# Patient Record
Sex: FEMALE | Race: WHITE | NOT HISPANIC OR LATINO | URBAN - METROPOLITAN AREA
[De-identification: names, ages, dates, MRNs, and addresses within clinical notes are randomized per-mention and may not be internally consistent; named-entity substitution may affect disease eponyms.]

---

## 2017-07-15 ENCOUNTER — EMERGENCY (EMERGENCY)
Facility: HOSPITAL | Age: 22
LOS: 1 days | Discharge: HOME | End: 2017-07-15
Attending: EMERGENCY MEDICINE | Admitting: EMERGENCY MEDICINE
Payer: COMMERCIAL

## 2017-07-15 VITALS
DIASTOLIC BLOOD PRESSURE: 91 MMHG | RESPIRATION RATE: 18 BRPM | OXYGEN SATURATION: 96 % | SYSTOLIC BLOOD PRESSURE: 124 MMHG | HEART RATE: 61 BPM | TEMPERATURE: 99 F

## 2017-07-15 PROCEDURE — 99282 EMERGENCY DEPT VISIT SF MDM: CPT | Mod: 25

## 2017-07-15 PROCEDURE — 99282 EMERGENCY DEPT VISIT SF MDM: CPT

## 2017-07-15 NOTE — ED PROVIDER NOTE - MEDICAL DECISION MAKING DETAILS
NIMO Brunner MD: 22 year old female with PMH IV heroin abuse (off heroin x 2 months) is BIB aunt for an evaluation of her B/L UE scars. Per patient, the scars look better than they did 2 months ago, have healed and are now improving. She admits to a small amt. of tenderness in some areas but denies any major pain or ttp to either UE. Denies F/C, focal numbness/weakness, focal neurologic deficits, CP, SOB, HA, dizziness, visual changes, neck pain/stiffness, abd pain, N/V/D. Exam pertinent for a significant amount of raised, keratotic scar tissue to B/L UE. No areas of fluctuance. +Pink granulation tissue surrouding several areas of scarring without ttp/warmth. Neurovascularly intact distally to B/L UEs with intact radial and ulnar pulses and cap refill <3 seconds. Pt well-appearing, non-toxic appearing. Cor RRR no murmurs. Lungs CTAB.  DDx: Scar tissue / granulation tissue from prior IVDA sites, possible underlying small abscess, possible retained FB beneath scar tissue (e.g. retained needle part)  I discussed with patient that we can obtain a set of Bcx to r/o endocarditis (although no signs/sx of it on exam), obtain b/l UE US to r/o underlying abscess and B/L UE xrays to r/o retained FB. Pt initially amenable to work-up (which was moreso requested by her aunt), but later did not wish to have any add'l work-up and requested to leave AMA. Myself and resident had multiple conversations with patient urging her to stay for the work-up, however, despite alternatives, knowledge of risks of leaving AMA, she wished to leave. Patient eloped prior to signing or receiving any paperwork and before the full AMA discussion. NIMO Brunner MD: 22 year old female with PMH IV heroin abuse (off heroin x 2 months) is BIB aunt for an evaluation of her B/L UE scars. Per patient, the scars look better than they did 2 months ago, have healed and are now improving. She admits to a small amt. of tenderness in some areas but denies any major pain or ttp to either UE. Denies F/C, focal numbness/weakness, focal neurologic deficits, CP, SOB, HA, dizziness, visual changes, neck pain/stiffness, abd pain, N/V/D. Exam pertinent for a significant amount of raised, keratotic scar tissue to B/L UE. No areas of fluctuance. +Pink granulation tissue surrouding several areas of scarring without ttp/warmth. Neurovascularly intact distally to B/L UEs with intact radial and ulnar pulses and cap refill <3 seconds. Pt well-appearing, non-toxic appearing. Cor RRR no murmurs. Lungs CTAB.  DDx: Scar tissue / granulation tissue from prior IVDA sites, possible underlying small abscess, possible retained FB beneath scar tissue (e.g. retained needle part)  I discussed with patient that we can obtain a set of Bcx to r/o endocarditis (although no signs/sx of it on exam), obtain b/l UE US to r/o underlying abscess and B/L UE xrays to r/o retained FB. Pt initially amenable to work-up (which was moreso requested by her aunt), but later did not wish to have any add'l work-up and requested to leave AMA. Myself and resident had multiple conversations with patient urging her to stay for the work-up, however, despite alternatives, knowledge of risks of leaving AMA, she wished to leave. Patient eloped prior to signing or receiving any paperwork and before the full AMA discussion. During her stay, I strongly advised her to establish a primary care physician for f/u of her chronic issues.

## 2017-07-15 NOTE — ED PROVIDER NOTE - OBJECTIVE STATEMENT
22 year old, with arm pain and swelling s/p herion injection 2 months ago. patient states shes had a lesion a few days after and it swelled over time and is looking moderately better. family member saw the wound today and requested patient seek attention. denies fevers. no fevers, no coughs, no night sweats. is currently on suboxone and states recent HIV test and does not want one today.    PMD: none

## 2017-07-15 NOTE — ED PROVIDER NOTE - PROGRESS NOTE DETAILS
Calixto PGY3: patient extremely anxious about having blood work obtained. doesn't want to be stuck by needles. nurse obtained cultures and lost the vein. refused more sticks. patient refusing continued work up and admission although admission recommended. patient eloped before opportunity to discharge ama.

## 2017-07-15 NOTE — ED PROVIDER NOTE - ATTENDING CONTRIBUTION TO CARE
I saw and evaluated patient with resident. I discussed H+P and MDM with resident. I agree with the statements made by the resident unless otherwise noted.

## 2017-07-15 NOTE — ED PROVIDER NOTE - CHPI ED SYMPTOMS NEG
no chills/no fever/no nausea/no weakness/no numbness/no decreased eating/drinking/no vomiting/no tingling/no pain

## 2017-07-15 NOTE — ED ADULT NURSE NOTE - OBJECTIVE STATEMENT
Pt with hx IV drug abuse presents with left arm wounds/pain x1 month. Multiple areas of eschar noted to left upper arm. Pt confided in aunt and was encouraged to seek medical attention. Pt requesting not to have blood drawn or be admitted regardless of results. Pt is A&Ox3. Cachectic. Ambulates. Denies cp/sob. Respirations even/unlabored. Abd flat. Denies n/v/d/urinary symptoms. Multiple areas of scarring noted to bilateral arms. Pt not forthcoming with information. Pt with hx IV drug abuse presents with left arm wounds/pain x1 month. Multiple areas of eschar noted to left upper arm. Pt confided in aunt and was encouraged to seek medical attention. Pt requesting not to have blood drawn or be admitted regardless of results. Pt is A&Ox3. Cachectic. Ambulates. Denies cp/sob. Respirations even/unlabored. Abd flat. Denies n/v/d/urinary symptoms. Multiple areas of scarring noted to bilateral arms. Pt not forthcoming with information.    Attempt to obtain labs was unsuccessful. MD aware.

## 2017-07-15 NOTE — ED ADULT TRIAGE NOTE - ARRIVAL INFO ADDITIONAL COMMENTS
noted b/l arm severe track marks for heroine and cocaine abuse last taken one month ago/states im in rehab

## 2017-07-15 NOTE — ED PROVIDER NOTE - PHYSICAL EXAMINATION
Calixto: A & O x 3, NAD, HEENT WNL and no facial asymmetry; lungs CTAB, heart with reg rhythm,  abdomen soft NTND; extremities with no edema; skin with extensive trackmarks on bilateral distal arms, left proximal upper extremity with numerous coin size eschars, mild erythema, dried oozing wounds, proximal warmth at level of shoulder, neuro exam non focal with no motor or sensory deficits Calixto: A & O x 3, NAD, HEENT WNL and no facial asymmetry; lungs CTAB, heart with reg rhythm,  abdomen soft NTND; extremities with no edema; skin with extensive trackmarks on bilateral distal arms, left proximal upper extremity with numerous coin size eschars, mild erythema, dried wounds, proximal warmth at level of shoulder, neuro exam non focal with no motor or sensory deficits

## 2017-07-15 NOTE — ED PROVIDER NOTE - SKIN WOUND DESCRIPTION
skin with extensive trackmarks on bilateral distal arms, left proximal upper extremity with numerous coin size eschars, mild erythema, dried wounds, proximal warmth at level of shoulder

## 2017-07-15 NOTE — ED PROVIDER NOTE - NS ED ROS FT
CONST: no fevers, no chills  EYES: no pain  ENT: no sore throat   CV: no chest pain  RESP: no shortness of breath  ABD: no abdominal pain   : no dysuria  MSK: no back pain  NEURO: no headache or additional neurologic complaints  HEME: no easy bleeding  SKIN:  left arm skin wound in AC

## 2017-07-16 VITALS
HEART RATE: 87 BPM | SYSTOLIC BLOOD PRESSURE: 135 MMHG | RESPIRATION RATE: 18 BRPM | OXYGEN SATURATION: 100 % | DIASTOLIC BLOOD PRESSURE: 91 MMHG

## 2018-05-19 ENCOUNTER — EMERGENCY (EMERGENCY)
Facility: HOSPITAL | Age: 23
LOS: 1 days | Discharge: HOME | End: 2018-05-19
Attending: EMERGENCY MEDICINE | Admitting: EMERGENCY MEDICINE
Payer: COMMERCIAL

## 2018-05-19 VITALS
DIASTOLIC BLOOD PRESSURE: 73 MMHG | TEMPERATURE: 98 F | HEART RATE: 56 BPM | OXYGEN SATURATION: 98 % | RESPIRATION RATE: 16 BRPM | SYSTOLIC BLOOD PRESSURE: 104 MMHG

## 2018-05-19 VITALS
RESPIRATION RATE: 18 BRPM | OXYGEN SATURATION: 100 % | HEART RATE: 79 BPM | DIASTOLIC BLOOD PRESSURE: 85 MMHG | TEMPERATURE: 99 F | SYSTOLIC BLOOD PRESSURE: 129 MMHG

## 2018-05-19 LAB
ALBUMIN SERPL ELPH-MCNC: 3.4 G/DL — SIGNIFICANT CHANGE UP (ref 3.3–5)
ALP SERPL-CCNC: 124 U/L — HIGH (ref 40–120)
ALT FLD-CCNC: 49 U/L — HIGH (ref 4–33)
APAP SERPL-MCNC: < 15 UG/ML — LOW (ref 15–25)
AST SERPL-CCNC: 46 U/L — HIGH (ref 4–32)
BASOPHILS # BLD AUTO: 0.06 K/UL — SIGNIFICANT CHANGE UP (ref 0–0.2)
BASOPHILS NFR BLD AUTO: 0.9 % — SIGNIFICANT CHANGE UP (ref 0–2)
BILIRUB SERPL-MCNC: 0.5 MG/DL — SIGNIFICANT CHANGE UP (ref 0.2–1.2)
BUN SERPL-MCNC: 5 MG/DL — LOW (ref 7–23)
CALCIUM SERPL-MCNC: 8.5 MG/DL — SIGNIFICANT CHANGE UP (ref 8.4–10.5)
CHLORIDE SERPL-SCNC: 105 MMOL/L — SIGNIFICANT CHANGE UP (ref 98–107)
CK MB BLD-MCNC: 2.09 NG/ML — SIGNIFICANT CHANGE UP (ref 1–4.7)
CK MB BLD-MCNC: SIGNIFICANT CHANGE UP (ref 0–2.5)
CK SERPL-CCNC: 146 U/L — SIGNIFICANT CHANGE UP (ref 25–170)
CO2 SERPL-SCNC: 24 MMOL/L — SIGNIFICANT CHANGE UP (ref 22–31)
CREAT SERPL-MCNC: 0.77 MG/DL — SIGNIFICANT CHANGE UP (ref 0.5–1.3)
EOSINOPHIL # BLD AUTO: 0.17 K/UL — SIGNIFICANT CHANGE UP (ref 0–0.5)
EOSINOPHIL NFR BLD AUTO: 2.5 % — SIGNIFICANT CHANGE UP (ref 0–6)
ETHANOL BLD-MCNC: < 10 MG/DL — SIGNIFICANT CHANGE UP
GLUCOSE SERPL-MCNC: 92 MG/DL — SIGNIFICANT CHANGE UP (ref 70–99)
HCG SERPL-ACNC: < 5 MIU/ML — SIGNIFICANT CHANGE UP
HCT VFR BLD CALC: 36.5 % — SIGNIFICANT CHANGE UP (ref 34.5–45)
HGB BLD-MCNC: 11.2 G/DL — LOW (ref 11.5–15.5)
IMM GRANULOCYTES # BLD AUTO: 0.02 # — SIGNIFICANT CHANGE UP
IMM GRANULOCYTES NFR BLD AUTO: 0.3 % — SIGNIFICANT CHANGE UP (ref 0–1.5)
LYMPHOCYTES # BLD AUTO: 3.3 K/UL — SIGNIFICANT CHANGE UP (ref 1–3.3)
LYMPHOCYTES # BLD AUTO: 49.1 % — HIGH (ref 13–44)
MCHC RBC-ENTMCNC: 25.9 PG — LOW (ref 27–34)
MCHC RBC-ENTMCNC: 30.7 % — LOW (ref 32–36)
MCV RBC AUTO: 84.5 FL — SIGNIFICANT CHANGE UP (ref 80–100)
MONOCYTES # BLD AUTO: 0.53 K/UL — SIGNIFICANT CHANGE UP (ref 0–0.9)
MONOCYTES NFR BLD AUTO: 7.9 % — SIGNIFICANT CHANGE UP (ref 2–14)
NEUTROPHILS # BLD AUTO: 2.64 K/UL — SIGNIFICANT CHANGE UP (ref 1.8–7.4)
NEUTROPHILS NFR BLD AUTO: 39.3 % — LOW (ref 43–77)
NRBC # FLD: 0 — SIGNIFICANT CHANGE UP
PLATELET # BLD AUTO: 398 K/UL — SIGNIFICANT CHANGE UP (ref 150–400)
PMV BLD: 9.9 FL — SIGNIFICANT CHANGE UP (ref 7–13)
POTASSIUM SERPL-MCNC: 4 MMOL/L — SIGNIFICANT CHANGE UP (ref 3.5–5.3)
POTASSIUM SERPL-SCNC: 4 MMOL/L — SIGNIFICANT CHANGE UP (ref 3.5–5.3)
PROT SERPL-MCNC: 7 G/DL — SIGNIFICANT CHANGE UP (ref 6–8.3)
RBC # BLD: 4.32 M/UL — SIGNIFICANT CHANGE UP (ref 3.8–5.2)
RBC # FLD: 13.7 % — SIGNIFICANT CHANGE UP (ref 10.3–14.5)
SALICYLATES SERPL-MCNC: < 5 MG/DL — LOW (ref 15–30)
SODIUM SERPL-SCNC: 140 MMOL/L — SIGNIFICANT CHANGE UP (ref 135–145)
WBC # BLD: 6.72 K/UL — SIGNIFICANT CHANGE UP (ref 3.8–10.5)
WBC # FLD AUTO: 6.72 K/UL — SIGNIFICANT CHANGE UP (ref 3.8–10.5)

## 2018-05-19 PROCEDURE — 99283 EMERGENCY DEPT VISIT LOW MDM: CPT | Mod: 25

## 2018-05-19 NOTE — ED PROVIDER NOTE - CONSTITUTIONAL, MLM
normal... somnolent; oriented to person, place, time/situation and in no apparent distress. falls asleep within 2-3s of waking up

## 2018-05-19 NOTE — ED ADULT NURSE NOTE - CHIEF COMPLAINT QUOTE
pt. w/ hx. heroin abuse was brought in from [address] [place] [address] . Pt. was visiting family member and stated she is actively withdrawing. Pt. states last time she used heroin was about 12 hrs ago. Pt. in triage very sleepy , but responds to verbal and painful stimuli . Pt. fs in triage 109, ekg in progress.

## 2018-05-19 NOTE — ED ADULT NURSE NOTE - OBJECTIVE STATEMENT
Pt. received in room 4. Pt. with hx of substance abuse states she is withdrawing from heroine. Pt. denies suicide ideation, homicide ideation, n/v/d, chest pain, SOB, fever, chills. Pt. states last heroine use was 5/18/18 in the morning. Pt. was [place] [facility]. Pt. is arousable to verbal and painful stimuli. Scares noted to bilateral arms, pt. states they are from heroine use. Pt. also has scabs on right upper arm. Belongings secured across from room 21. MD evaluation in progress. Facilitator PASTORA Chisholm at bedside for IV access. Pt. received in room 4, A&Ox3. Pt. with hx of substance abuse states she is withdrawing from heroine. Pt. denies suicide ideation, homicide ideation, n/v/d, chest pain, SOB, fever, chills. Pt. states last heroine use was 5/18/18 in the morning. Pt. was visiting [place] at [facility]. Pt. is arousable to verbal and painful stimuli. Scares noted to bilateral arms, pt. states they are from heroine use. Pt. also has scabs on right upper arm. Belongings secured across from room 21. MD evaluation in progress. Facilitator PASTORA Chisholm at bedside for IV access.

## 2018-05-19 NOTE — ED PROVIDER NOTE - MEDICAL DECISION MAKING DETAILS
MDM: 24yo F with hx of substance abuse BIB from [facility] (boyfriend hospitalized) with mild heroine withdrawal sxs. feels cold/hot. denies n/v. will get CBC, CMP, CK. MDM: 24yo F with hx of substance abuse BIB from [facility] [place] [facility] (boyfriend hospitalized) with opioid intoxication will get CBC, CMP, CK and monitor pt.

## 2018-05-19 NOTE — ED PROVIDER NOTE - OBJECTIVE STATEMENT
22yo F with hx of substance abuse presents with heroine withdrawal symptoms - consisting of hot/cold symptoms, back aches.   denies n/v. Hx of crack cocaine/heroine abuse - uses "as much as she can" per day. last heroine use was 5/18 AM; last cocaine use was 5/17. Pt denies cp/sob. no recent fevers, chills, travel. pt boyfriend is hospitalized at [address] [place] [address] at [facility] 22yo F with hx of substance abuse presents with intoxication - consisting of hot/cold symptoms, back aches.   denies n/v. Hx of crack cocaine/heroine abuse - uses "as much as she can" per day. last heroin use was 5/18 AM; last cocaine use was 5/17. Pt denies cp/sob. no recent fevers, chills, travel. pt [place] [facility]

## 2018-05-19 NOTE — ED PROVIDER NOTE - SHIFT CHANGE DETAILS
I have signed over this patient to the above attending physician. Pertinent history, physical exam findings and workup thus far in the ED have been discussed. The pending tests and plan, including reassessment for clinical sobriety were signed over.  All questions from the above attending physician have been answered.

## 2018-05-19 NOTE — ED PROVIDER NOTE - ATTENDING CONTRIBUTION TO CARE
Chrissy: 24 yo female with a h/o cocaine and IVDA sent in from [facility]. Pt was visiting with her boyfriend, staff noticed that she seemed intoxicated and sent her to the ED.  Pt admits to regular heroin and cocaine use but denies use tonight. NO fevers or chills. Pt extremely somnolent but denies complaints. Exam: no obvious signs of trauma, MMM, NO scleral icterus, +Miosis, +S1/S2, no murmurs, rubs or gallops, lungs CTA b/l, abdomens is thin but soft and nontender. NO LE edema. innumerous track marks with palpable underlying sclerosis, and healing skin lesions on b/l UE. no signs of surrounding infection. no erythema or warmth to touch. A/P- 24 yo female with opioid intoxication but maintaining adequate respiratory drive. will obtain basic labs, ck, apa, asa, etoh, and monitor on end tidal CO2.

## 2018-06-11 ENCOUNTER — OUTPATIENT (OUTPATIENT)
Dept: OUTPATIENT SERVICES | Facility: HOSPITAL | Age: 23
LOS: 1 days | Discharge: HOME | End: 2018-06-11

## 2018-06-11 DIAGNOSIS — F11.99 OPIOID USE, UNSPECIFIED WITH UNSPECIFIED OPIOID-INDUCED DISORDER: ICD-10-CM

## 2018-06-12 DIAGNOSIS — T40.605A ADVERSE EFFECT OF UNSPECIFIED NARCOTICS, INITIAL ENCOUNTER: ICD-10-CM

## 2018-06-12 DIAGNOSIS — F11.20 OPIOID DEPENDENCE, UNCOMPLICATED: ICD-10-CM

## 2018-06-18 LAB
CHOLEST SERPL-MCNC: 148 MG/DL — SIGNIFICANT CHANGE UP (ref 120–199)
HAV IGG SER QL IA: NONREACTIVE — SIGNIFICANT CHANGE UP
HAV IGM SER-ACNC: NONREACTIVE — SIGNIFICANT CHANGE UP
HBV CORE AB SER-ACNC: NONREACTIVE — SIGNIFICANT CHANGE UP
HBV CORE IGM SER-ACNC: NONREACTIVE — SIGNIFICANT CHANGE UP
HBV SURFACE AB SER-ACNC: NONREACTIVE — SIGNIFICANT CHANGE UP
HBV SURFACE AG SER-ACNC: NEGATIVE — SIGNIFICANT CHANGE UP
HCG UR-SCNC: NEGATIVE — SIGNIFICANT CHANGE UP
HDLC SERPL-MCNC: 48 MG/DL — SIGNIFICANT CHANGE UP (ref 45–65)
LIPID PNL WITH DIRECT LDL SERPL: 87 MG/DL — SIGNIFICANT CHANGE UP
SP GR UR: 1.02 — SIGNIFICANT CHANGE UP (ref 1–1.03)
TRIGL SERPL-MCNC: 97 MG/DL — SIGNIFICANT CHANGE UP (ref 10–149)

## 2018-06-19 LAB — T PALLIDUM AB TITR SER: NEGATIVE — SIGNIFICANT CHANGE UP

## 2018-06-22 LAB — HCV GENTYP BLD NAA+PROBE: SIGNIFICANT CHANGE UP

## 2018-08-10 LAB
APPEARANCE UR: SIGNIFICANT CHANGE UP
BILIRUB UR-MCNC: NEGATIVE — SIGNIFICANT CHANGE UP
BLOOD UR QL VISUAL: NEGATIVE — SIGNIFICANT CHANGE UP
COD CRY URNS QL: SIGNIFICANT CHANGE UP (ref 0–0)
COLOR SPEC: YELLOW — SIGNIFICANT CHANGE UP
GLUCOSE UR-MCNC: NEGATIVE — SIGNIFICANT CHANGE UP
KETONES UR-MCNC: SIGNIFICANT CHANGE UP
LEUKOCYTE ESTERASE UR-ACNC: HIGH
MUCOUS THREADS # UR AUTO: SIGNIFICANT CHANGE UP
NITRITE UR-MCNC: NEGATIVE — SIGNIFICANT CHANGE UP
NON-SQ EPI CELLS # UR AUTO: 2 — SIGNIFICANT CHANGE UP
PH UR: 6 — SIGNIFICANT CHANGE UP (ref 4.6–8)
PROT UR-MCNC: 30 MG/DL — HIGH
RBC CASTS # UR COMP ASSIST: HIGH (ref 0–?)
SP GR SPEC: 1.03 — SIGNIFICANT CHANGE UP (ref 1–1.04)
SQUAMOUS # UR AUTO: SIGNIFICANT CHANGE UP
UROBILINOGEN FLD QL: 4 MG/DL — HIGH
WBC CLUMPS #/AREA URNS HPF: PRESENT — HIGH (ref 0–?)
WBC UR QL: HIGH (ref 0–?)

## 2018-08-11 LAB
HCV RNA SERPL NAA DL=5-ACNC: HIGH IU/ML
HCV RNA SPEC NAA+PROBE-LOG IU: 6.35 LOGIU/ML — HIGH
T PALLIDUM AB TITR SER: NEGATIVE — SIGNIFICANT CHANGE UP

## 2018-08-28 ENCOUNTER — APPOINTMENT (OUTPATIENT)
Dept: HEPATOLOGY | Facility: CLINIC | Age: 23
End: 2018-08-28

## 2018-09-07 PROBLEM — F19.10 OTHER PSYCHOACTIVE SUBSTANCE ABUSE, UNCOMPLICATED: Chronic | Status: ACTIVE | Noted: 2017-07-15

## 2018-09-17 ENCOUNTER — APPOINTMENT (OUTPATIENT)
Dept: HEPATOLOGY | Facility: CLINIC | Age: 23
End: 2018-09-17

## 2018-11-01 ENCOUNTER — OUTPATIENT (OUTPATIENT)
Dept: OUTPATIENT SERVICES | Facility: HOSPITAL | Age: 23
LOS: 1 days | End: 2018-11-01
Payer: COMMERCIAL

## 2018-11-06 ENCOUNTER — OUTPATIENT (OUTPATIENT)
Dept: OUTPATIENT SERVICES | Facility: HOSPITAL | Age: 23
LOS: 1 days | Discharge: HOME | End: 2018-11-06

## 2018-11-13 LAB
HCG UR-SCNC: POSITIVE — SIGNIFICANT CHANGE UP
SP GR UR: 1.02 — SIGNIFICANT CHANGE UP (ref 1–1.03)

## 2018-11-16 DIAGNOSIS — Z71.89 OTHER SPECIFIED COUNSELING: ICD-10-CM

## 2018-11-20 ENCOUNTER — APPOINTMENT (OUTPATIENT)
Dept: HEPATOLOGY | Facility: CLINIC | Age: 23
End: 2018-11-20

## 2018-12-07 ENCOUNTER — ASOB RESULT (OUTPATIENT)
Age: 23
End: 2018-12-07

## 2018-12-07 ENCOUNTER — APPOINTMENT (OUTPATIENT)
Dept: ANTEPARTUM | Facility: CLINIC | Age: 23
End: 2018-12-07
Payer: MEDICAID

## 2018-12-07 PROCEDURE — 99241 OFFICE CONSULTATION NEW/ESTAB PATIENT 15 MIN: CPT | Mod: 25

## 2018-12-07 PROCEDURE — 76811 OB US DETAILED SNGL FETUS: CPT | Mod: 26

## 2018-12-10 ENCOUNTER — APPOINTMENT (OUTPATIENT)
Dept: OBGYN | Facility: HOSPITAL | Age: 23
End: 2018-12-10

## 2019-02-01 PROCEDURE — G9005: CPT

## 2019-02-08 ENCOUNTER — APPOINTMENT (OUTPATIENT)
Dept: HEPATOLOGY | Facility: CLINIC | Age: 24
End: 2019-02-08

## 2019-03-29 ENCOUNTER — APPOINTMENT (OUTPATIENT)
Dept: HEPATOLOGY | Facility: CLINIC | Age: 24
End: 2019-03-29
Payer: COMMERCIAL

## 2019-03-29 VITALS
BODY MASS INDEX: 22.36 KG/M2 | SYSTOLIC BLOOD PRESSURE: 115 MMHG | RESPIRATION RATE: 14 BRPM | DIASTOLIC BLOOD PRESSURE: 74 MMHG | TEMPERATURE: 98.6 F | HEART RATE: 81 BPM | WEIGHT: 131 LBS | HEIGHT: 64 IN

## 2019-03-29 PROCEDURE — 99203 OFFICE O/P NEW LOW 30 MIN: CPT

## 2019-03-29 RX ORDER — GLECAPREVIR AND PIBRENTASVIR 40; 100 MG/1; MG/1
100-40 TABLET, FILM COATED ORAL
Qty: 168 | Refills: 0 | Status: COMPLETED | COMMUNITY
Start: 2019-03-29 | End: 2019-05-24

## 2019-03-29 NOTE — HISTORY OF PRESENT ILLNESS
[Infected Sexual Partner] : infected sexual partner [IV Drug Use] : IV drug use [Tattoo] : tattoo(s) [Body Piercing] : body piercing [Needlestick Exposure] : no needlestick exposure [Hemodialysis] : no hemodialysis [Transfusion before 1992] : no transfusion before 1992 [Transplant before 1992] : no transplant before 1992 [Alcohol Abuse] : no alcohol abuse [Autoimmune Disorder] : no autoimmune disorder [Household Contact to HBV] : no household contact to HBV [Travel to Endemic Area] : no travel to an endemic area [Occupational Exposure] : no occupational exposure [de-identified] : Ms. Oakes is a 25 yo F with a history of IVDU on methadone treatment, who is here to establish care for chronic HCV (genotype 1a, treatment naive, with HCV RNA 2,256,971 IU/mL in 8/10/18). She was first diagnosed in early 2018 when she was tested in methadone clinic. She thinks she acquired the virus from prior IVDU or from her boyfriend, who was successfully treated for HCV in the recent past. She has 1 professional tattoo on her right calf. Ears are pierced. No alcohol use. Has an IUD and uses condoms as well.\par \par She has no known cirrhosis. She does not have HBV co-infection based on prior labs. She previously was a no-show to clinic multiple times in the past, including most recently on 2/8/19.\par \par Currently feeling well with no complaints.

## 2019-03-29 NOTE — ASSESSMENT
[FreeTextEntry1] : 25 yo F with chronic HCV (genotype 1a, treatment naive, without known cirrhosis, with most recent HCV RNA 2,256,971 IU/mL in 8/2018). Low clinical suspicion for cirrhosis based on prior labs, with calculated FIB-4 score of 0.40. Will re-check labs today and also ordered FibroScan and abdominal US to rule out cirrhosis and HCC. Will also check for HIV co-infection and re-check for immunity to HAV and HBV.\par \par I have discussed with her at length regarding hepatitis C. I reviewed the natural history, modes of transmission, evaluation, prognosis, and possible treatment. I have discussed the staging of the disease including FibroScan (ordered). I have reviewed with her currently approved, potential treatment regimens, potential durations of therapy, and potential side effects. She is interested in these therapies. The exact choice of regimen will depend in part on insurance coverage, but I will prescribe an 8-week course of Mavyret. I have discussed with her the importance of 100% medication adherence once therapy is initiated in order to avoid formation of resistant viral strains and maximize the likelihood of achieving a sustained virologic response (SVR). I have recommended that her family and sexual partners be evaluated for hepatitis C as appropriate. She was also advised that the hepatitis C antibody will likely stay positive life-long but does not confer protection from possible re-infection by HCV.\par \par Will offer vaccines for HAV and HBV if non-immune based on labs.\par \par She will return to clinic for follow-up in 3 months.

## 2019-04-01 LAB
AFP-TM SERPL-MCNC: 3.2 NG/ML
ALBUMIN SERPL ELPH-MCNC: 3.9 G/DL
ALP BLD-CCNC: 303 U/L
ALT SERPL-CCNC: 244 U/L
ANION GAP SERPL CALC-SCNC: 10 MMOL/L
AST SERPL-CCNC: 284 U/L
BASOPHILS # BLD AUTO: 0.06 K/UL
BASOPHILS NFR BLD AUTO: 1.1 %
BILIRUB DIRECT SERPL-MCNC: 0.2 MG/DL
BILIRUB SERPL-MCNC: 0.3 MG/DL
BUN SERPL-MCNC: 7 MG/DL
CALCIUM SERPL-MCNC: 9.7 MG/DL
CHLORIDE SERPL-SCNC: 103 MMOL/L
CO2 SERPL-SCNC: 26 MMOL/L
CREAT SERPL-MCNC: 0.95 MG/DL
EOSINOPHIL # BLD AUTO: 0.15 K/UL
EOSINOPHIL NFR BLD AUTO: 2.6 %
GLUCOSE SERPL-MCNC: 104 MG/DL
HBV CORE IGG+IGM SER QL: NONREACTIVE
HBV SURFACE AB SERPL IA-ACNC: 200.7 MIU/ML
HBV SURFACE AG SER QL: NONREACTIVE
HCT VFR BLD CALC: 43.2 %
HCV RNA SERPL NAA DL=5-ACNC: ABNORMAL IU/ML
HCV RNA SERPL NAA+PROBE-LOG IU: 6.18 LOGIU/ML
HEPATITIS A IGG ANTIBODY: REACTIVE
HGB BLD-MCNC: 12.6 G/DL
HIV1+2 AB SPEC QL IA.RAPID: NONREACTIVE
IMM GRANULOCYTES NFR BLD AUTO: 0.2 %
INR PPP: 1.02 RATIO
LYMPHOCYTES # BLD AUTO: 2.64 K/UL
LYMPHOCYTES NFR BLD AUTO: 46.5 %
MAN DIFF?: NORMAL
MCHC RBC-ENTMCNC: 24.8 PG
MCHC RBC-ENTMCNC: 29.2 GM/DL
MCV RBC AUTO: 85 FL
MONOCYTES # BLD AUTO: 0.78 K/UL
MONOCYTES NFR BLD AUTO: 13.7 %
NEUTROPHILS # BLD AUTO: 2.04 K/UL
NEUTROPHILS NFR BLD AUTO: 35.9 %
PLATELET # BLD AUTO: 430 K/UL
POTASSIUM SERPL-SCNC: 4.8 MMOL/L
PROT SERPL-MCNC: 7.3 G/DL
PT BLD: 11.5 SEC
RBC # BLD: 5.08 M/UL
RBC # FLD: 16.9 %
SODIUM SERPL-SCNC: 139 MMOL/L
WBC # FLD AUTO: 5.68 K/UL

## 2019-04-02 LAB — HCV GENTYP BLD NAA+PROBE: NORMAL

## 2019-04-10 ENCOUNTER — APPOINTMENT (OUTPATIENT)
Dept: HEPATOLOGY | Facility: CLINIC | Age: 24
End: 2019-04-10

## 2019-04-10 LAB
HCV NS5 MUT DET ISLT GENOTYP: NORMAL
REF LAB TEST METHOD: NORMAL

## 2019-05-31 ENCOUNTER — EMERGENCY (EMERGENCY)
Facility: HOSPITAL | Age: 24
LOS: 1 days | Discharge: HOME | End: 2019-05-31
Attending: EMERGENCY MEDICINE | Admitting: EMERGENCY MEDICINE
Payer: COMMERCIAL

## 2019-05-31 VITALS
HEIGHT: 63 IN | WEIGHT: 134.92 LBS | RESPIRATION RATE: 18 BRPM | SYSTOLIC BLOOD PRESSURE: 121 MMHG | OXYGEN SATURATION: 98 % | HEART RATE: 94 BPM | DIASTOLIC BLOOD PRESSURE: 83 MMHG | TEMPERATURE: 100 F

## 2019-05-31 VITALS
OXYGEN SATURATION: 99 % | HEART RATE: 85 BPM | SYSTOLIC BLOOD PRESSURE: 117 MMHG | DIASTOLIC BLOOD PRESSURE: 82 MMHG | TEMPERATURE: 98 F | RESPIRATION RATE: 19 BRPM

## 2019-05-31 PROCEDURE — 73630 X-RAY EXAM OF FOOT: CPT

## 2019-05-31 PROCEDURE — 73610 X-RAY EXAM OF ANKLE: CPT

## 2019-05-31 PROCEDURE — 99283 EMERGENCY DEPT VISIT LOW MDM: CPT

## 2019-05-31 PROCEDURE — 73610 X-RAY EXAM OF ANKLE: CPT | Mod: 26,LT

## 2019-05-31 PROCEDURE — 73630 X-RAY EXAM OF FOOT: CPT | Mod: 26,LT

## 2019-05-31 PROCEDURE — 99284 EMERGENCY DEPT VISIT MOD MDM: CPT | Mod: 25

## 2019-05-31 RX ORDER — IBUPROFEN 200 MG
600 TABLET ORAL ONCE
Refills: 0 | Status: COMPLETED | OUTPATIENT
Start: 2019-05-31 | End: 2019-05-31

## 2019-05-31 RX ADMIN — Medication 600 MILLIGRAM(S): at 17:06

## 2019-05-31 NOTE — ED PROVIDER NOTE - CARE PLAN
Principal Discharge DX:	Sprain of left ankle, unspecified ligament, initial encounter  Secondary Diagnosis:	Foot sprain, left, initial encounter

## 2019-05-31 NOTE — ED PROVIDER NOTE - PROGRESS NOTE DETAILS
Reevaluated patient at bedside.  Patient feeling improved after ace wrap applied. crutches provided. Discussed the results of all diagnostic testing in ED.  An opportunity to ask questions was given.  Discussed the importance of prompt, close medical follow-up.  Patient will return with any changes, concerns or persistent / worsening symptoms.  Understanding of all instructions verbalized.  will follow up with ortho. referral provided

## 2019-05-31 NOTE — ED PROVIDER NOTE - OBJECTIVE STATEMENT
24 year old with history of opiate abuse (currently on methadone) presents with pain and swelling to left foot and ankle since yesterday. missed a step last night and hyperextended left foot and twisted ankle. denies hitting head or LOC. no neck or back pain. pain in foot 6/10. has not taken anything for pain or symptoms. no n/t  PCP Esdras (methadone clinic)

## 2019-05-31 NOTE — ED ADULT NURSE NOTE - CAS ELECT INFOMATION PROVIDED
--Corrected calcium 9.4 today.  --patient refusing scheduled po calcium replacement due to nausea       DC instructions

## 2019-05-31 NOTE — ED ADULT NURSE NOTE - OBJECTIVE STATEMENT
23 y/o female presents to the ed c/o left ankle pain since last night. pt states she was walking down the steps last night and missed the last step and twisted left ankle. no other pain. swelling noted to LLE. denies hitting head.

## 2019-05-31 NOTE — ED ADULT NURSE NOTE - NSIMPLEMENTINTERV_GEN_ALL_ED
Implemented All Fall with Harm Risk Interventions:  [place] to call system. Call bell, personal items and telephone within reach. Instruct patient to call for assistance. Room bathroom lighting operational. Non-slip footwear when patient is off stretcher. Physically safe environment: no spills, clutter or unnecessary equipment. Stretcher in lowest position, wheels locked, appropriate side rails in place. Provide visual cue, wrist band, yellow gown, etc. Monitor gait and stability. Monitor for mental status changes and reorient to person, place, and time. Review medications for side effects contributing to fall risk. Reinforce activity limits and safety measures with patient and family. Provide visual clues: red socks.

## 2019-05-31 NOTE — ED PROVIDER NOTE - ATTENDING CONTRIBUTION TO CARE
24y F hx of opiate abuse on methadone here with L ankle pain sp trip and fall injury last night. No other reported injuries, has been ambulating on since occurred. Will obtain Xrays to r/o fracture though suspect soft tissue injury. RICE, crutches, NSAIDS and or tylenol. OP FU.

## 2019-05-31 NOTE — ED PROVIDER NOTE - MUSCULOSKELETAL, MLM
diffuse tenderness and swelling to ankle (both medial and lateral aspect) and dorsal aspect of left foot. achilles tendon intact. no deformity

## 2019-05-31 NOTE — ED PROVIDER NOTE - CLINICAL SUMMARY MEDICAL DECISION MAKING FREE TEXT BOX
left ankle and foot pain after injury. will x-ray, motrin, ortho referral. declines hitting head or LOC. do not suspect ICH or skull fx. no vert tenderness to suggest vert fx

## 2019-06-20 PROBLEM — F11.10 OPIOID ABUSE, UNCOMPLICATED: Chronic | Status: ACTIVE | Noted: 2019-05-31

## 2019-07-01 ENCOUNTER — APPOINTMENT (OUTPATIENT)
Dept: HEPATOLOGY | Facility: CLINIC | Age: 24
End: 2019-07-01

## 2019-07-15 ENCOUNTER — APPOINTMENT (OUTPATIENT)
Dept: HEPATOLOGY | Facility: CLINIC | Age: 24
End: 2019-07-15

## 2019-07-24 ENCOUNTER — APPOINTMENT (OUTPATIENT)
Dept: HEPATOLOGY | Facility: CLINIC | Age: 24
End: 2019-07-24

## 2019-08-29 LAB
ALBUMIN SERPL ELPH-MCNC: 4.1 G/DL — SIGNIFICANT CHANGE UP (ref 3.3–5)
ALP SERPL-CCNC: 240 U/L — HIGH (ref 40–120)
ALT FLD-CCNC: 78 U/L — HIGH (ref 4–33)
ANION GAP SERPL CALC-SCNC: 11 MMO/L — SIGNIFICANT CHANGE UP (ref 7–14)
AST SERPL-CCNC: 105 U/L — HIGH (ref 4–32)
BILIRUB SERPL-MCNC: 0.6 MG/DL — SIGNIFICANT CHANGE UP (ref 0.2–1.2)
BUN SERPL-MCNC: 6 MG/DL — LOW (ref 7–23)
CALCIUM SERPL-MCNC: 9.5 MG/DL — SIGNIFICANT CHANGE UP (ref 8.4–10.5)
CHLORIDE SERPL-SCNC: 104 MMOL/L — SIGNIFICANT CHANGE UP (ref 98–107)
CHOLEST SERPL-MCNC: 162 MG/DL — SIGNIFICANT CHANGE UP (ref 120–199)
CO2 SERPL-SCNC: 23 MMOL/L — SIGNIFICANT CHANGE UP (ref 22–31)
CREAT SERPL-MCNC: 0.75 MG/DL — SIGNIFICANT CHANGE UP (ref 0.5–1.3)
GLUCOSE SERPL-MCNC: 96 MG/DL — SIGNIFICANT CHANGE UP (ref 70–99)
HCT VFR BLD CALC: 46.4 % — HIGH (ref 34.5–45)
HDLC SERPL-MCNC: 39 MG/DL — LOW (ref 45–65)
HGB BLD-MCNC: 14.2 G/DL — SIGNIFICANT CHANGE UP (ref 11.5–15.5)
LIPID PNL WITH DIRECT LDL SERPL: 115 MG/DL — SIGNIFICANT CHANGE UP
MCHC RBC-ENTMCNC: 27 PG — SIGNIFICANT CHANGE UP (ref 27–34)
MCHC RBC-ENTMCNC: 30.6 % — LOW (ref 32–36)
MCV RBC AUTO: 88.4 FL — SIGNIFICANT CHANGE UP (ref 80–100)
NRBC # FLD: 0 K/UL — SIGNIFICANT CHANGE UP (ref 0–0)
PLATELET # BLD AUTO: 339 K/UL — SIGNIFICANT CHANGE UP (ref 150–400)
PMV BLD: 10.6 FL — SIGNIFICANT CHANGE UP (ref 7–13)
POTASSIUM SERPL-MCNC: 3.9 MMOL/L — SIGNIFICANT CHANGE UP (ref 3.5–5.3)
POTASSIUM SERPL-SCNC: 3.9 MMOL/L — SIGNIFICANT CHANGE UP (ref 3.5–5.3)
PROT SERPL-MCNC: 8.1 G/DL — SIGNIFICANT CHANGE UP (ref 6–8.3)
RBC # BLD: 5.25 M/UL — HIGH (ref 3.8–5.2)
RBC # FLD: 16 % — HIGH (ref 10.3–14.5)
SODIUM SERPL-SCNC: 138 MMOL/L — SIGNIFICANT CHANGE UP (ref 135–145)
TRIGL SERPL-MCNC: 100 MG/DL — SIGNIFICANT CHANGE UP (ref 10–149)
WBC # BLD: 8.23 K/UL — SIGNIFICANT CHANGE UP (ref 3.8–10.5)
WBC # FLD AUTO: 8.23 K/UL — SIGNIFICANT CHANGE UP (ref 3.8–10.5)

## 2019-09-17 ENCOUNTER — APPOINTMENT (OUTPATIENT)
Dept: HEPATOLOGY | Facility: CLINIC | Age: 24
End: 2019-09-17
Payer: COMMERCIAL

## 2019-09-17 PROCEDURE — 91200 LIVER ELASTOGRAPHY: CPT

## 2019-10-09 ENCOUNTER — EMERGENCY (EMERGENCY)
Facility: HOSPITAL | Age: 24
LOS: 1 days | Discharge: HOME | End: 2019-10-09
Attending: EMERGENCY MEDICINE
Payer: COMMERCIAL

## 2019-10-09 VITALS
HEIGHT: 64 IN | SYSTOLIC BLOOD PRESSURE: 108 MMHG | RESPIRATION RATE: 18 BRPM | TEMPERATURE: 97 F | OXYGEN SATURATION: 96 % | WEIGHT: 145.06 LBS | HEART RATE: 103 BPM | DIASTOLIC BLOOD PRESSURE: 70 MMHG

## 2019-10-09 LAB
ALBUMIN SERPL ELPH-MCNC: 4.2 G/DL — SIGNIFICANT CHANGE UP (ref 3.3–5.2)
ALP SERPL-CCNC: 245 U/L — HIGH (ref 40–120)
ALT FLD-CCNC: 157 U/L — HIGH
AMPHET UR-MCNC: NEGATIVE — SIGNIFICANT CHANGE UP
ANION GAP SERPL CALC-SCNC: 13 MMOL/L — SIGNIFICANT CHANGE UP (ref 5–17)
APAP SERPL-MCNC: <7.5 UG/ML — LOW (ref 10–26)
APPEARANCE UR: CLEAR — SIGNIFICANT CHANGE UP
AST SERPL-CCNC: 213 U/L — HIGH
BACTERIA # UR AUTO: ABNORMAL
BARBITURATES UR SCN-MCNC: NEGATIVE — SIGNIFICANT CHANGE UP
BASOPHILS # BLD AUTO: 0.04 K/UL — SIGNIFICANT CHANGE UP (ref 0–0.2)
BASOPHILS NFR BLD AUTO: 0.6 % — SIGNIFICANT CHANGE UP (ref 0–2)
BENZODIAZ UR-MCNC: NEGATIVE — SIGNIFICANT CHANGE UP
BILIRUB SERPL-MCNC: 0.6 MG/DL — SIGNIFICANT CHANGE UP (ref 0.4–2)
BILIRUB UR-MCNC: NEGATIVE — SIGNIFICANT CHANGE UP
BUN SERPL-MCNC: 7 MG/DL — LOW (ref 8–20)
CALCIUM SERPL-MCNC: 9.3 MG/DL — SIGNIFICANT CHANGE UP (ref 8.6–10.2)
CHLORIDE SERPL-SCNC: 103 MMOL/L — SIGNIFICANT CHANGE UP (ref 98–107)
CO2 SERPL-SCNC: 26 MMOL/L — SIGNIFICANT CHANGE UP (ref 22–29)
COCAINE METAB.OTHER UR-MCNC: NEGATIVE — SIGNIFICANT CHANGE UP
COLOR SPEC: YELLOW — SIGNIFICANT CHANGE UP
CREAT SERPL-MCNC: 0.78 MG/DL — SIGNIFICANT CHANGE UP (ref 0.5–1.3)
DIFF PNL FLD: NEGATIVE — SIGNIFICANT CHANGE UP
EOSINOPHIL # BLD AUTO: 0.14 K/UL — SIGNIFICANT CHANGE UP (ref 0–0.5)
EOSINOPHIL NFR BLD AUTO: 2 % — SIGNIFICANT CHANGE UP (ref 0–6)
EPI CELLS # UR: ABNORMAL
ETHANOL SERPL-MCNC: <10 MG/DL — SIGNIFICANT CHANGE UP
FLU A RESULT: SIGNIFICANT CHANGE UP
FLU A RESULT: SIGNIFICANT CHANGE UP
FLUAV AG NPH QL: SIGNIFICANT CHANGE UP
FLUBV AG NPH QL: SIGNIFICANT CHANGE UP
GLUCOSE SERPL-MCNC: 76 MG/DL — SIGNIFICANT CHANGE UP (ref 70–115)
GLUCOSE UR QL: NEGATIVE MG/DL — SIGNIFICANT CHANGE UP
HCG SERPL-ACNC: <4 MIU/ML — SIGNIFICANT CHANGE UP
HCT VFR BLD CALC: 48.1 % — HIGH (ref 34.5–45)
HGB BLD-MCNC: 14.8 G/DL — SIGNIFICANT CHANGE UP (ref 11.5–15.5)
IMM GRANULOCYTES NFR BLD AUTO: 0.4 % — SIGNIFICANT CHANGE UP (ref 0–1.5)
KETONES UR-MCNC: NEGATIVE — SIGNIFICANT CHANGE UP
LACTATE BLDV-MCNC: 1.1 MMOL/L — SIGNIFICANT CHANGE UP (ref 0.5–2)
LEUKOCYTE ESTERASE UR-ACNC: ABNORMAL
LYMPHOCYTES # BLD AUTO: 3.75 K/UL — HIGH (ref 1–3.3)
LYMPHOCYTES # BLD AUTO: 52.4 % — HIGH (ref 13–44)
MCHC RBC-ENTMCNC: 27.2 PG — SIGNIFICANT CHANGE UP (ref 27–34)
MCHC RBC-ENTMCNC: 30.8 GM/DL — LOW (ref 32–36)
MCV RBC AUTO: 88.4 FL — SIGNIFICANT CHANGE UP (ref 80–100)
METHADONE UR-MCNC: POSITIVE
MONOCYTES # BLD AUTO: 0.59 K/UL — SIGNIFICANT CHANGE UP (ref 0–0.9)
MONOCYTES NFR BLD AUTO: 8.3 % — SIGNIFICANT CHANGE UP (ref 2–14)
NEUTROPHILS # BLD AUTO: 2.6 K/UL — SIGNIFICANT CHANGE UP (ref 1.8–7.4)
NEUTROPHILS NFR BLD AUTO: 36.3 % — LOW (ref 43–77)
NITRITE UR-MCNC: NEGATIVE — SIGNIFICANT CHANGE UP
OPIATES UR-MCNC: NEGATIVE — SIGNIFICANT CHANGE UP
PCP SPEC-MCNC: SIGNIFICANT CHANGE UP
PCP UR-MCNC: NEGATIVE — SIGNIFICANT CHANGE UP
PH UR: 6.5 — SIGNIFICANT CHANGE UP (ref 5–8)
PLATELET # BLD AUTO: 364 K/UL — SIGNIFICANT CHANGE UP (ref 150–400)
POTASSIUM SERPL-MCNC: 3.8 MMOL/L — SIGNIFICANT CHANGE UP (ref 3.5–5.3)
POTASSIUM SERPL-SCNC: 3.8 MMOL/L — SIGNIFICANT CHANGE UP (ref 3.5–5.3)
PROT SERPL-MCNC: 8.7 G/DL — SIGNIFICANT CHANGE UP (ref 6.6–8.7)
PROT UR-MCNC: NEGATIVE MG/DL — SIGNIFICANT CHANGE UP
RBC # BLD: 5.44 M/UL — HIGH (ref 3.8–5.2)
RBC # FLD: 15.7 % — HIGH (ref 10.3–14.5)
RBC CASTS # UR COMP ASSIST: SIGNIFICANT CHANGE UP /HPF (ref 0–4)
RSV RESULT: SIGNIFICANT CHANGE UP
RSV RNA RESP QL NAA+PROBE: SIGNIFICANT CHANGE UP
SALICYLATES SERPL-MCNC: <0.6 MG/DL — LOW (ref 10–20)
SODIUM SERPL-SCNC: 142 MMOL/L — SIGNIFICANT CHANGE UP (ref 135–145)
SP GR SPEC: 1.01 — SIGNIFICANT CHANGE UP (ref 1.01–1.02)
THC UR QL: NEGATIVE — SIGNIFICANT CHANGE UP
UROBILINOGEN FLD QL: NEGATIVE MG/DL — SIGNIFICANT CHANGE UP
WBC # BLD: 7.15 K/UL — SIGNIFICANT CHANGE UP (ref 3.8–10.5)
WBC # FLD AUTO: 7.15 K/UL — SIGNIFICANT CHANGE UP (ref 3.8–10.5)
WBC UR QL: ABNORMAL

## 2019-10-09 PROCEDURE — 84702 CHORIONIC GONADOTROPIN TEST: CPT

## 2019-10-09 PROCEDURE — 71045 X-RAY EXAM CHEST 1 VIEW: CPT | Mod: 26

## 2019-10-09 PROCEDURE — 36415 COLL VENOUS BLD VENIPUNCTURE: CPT

## 2019-10-09 PROCEDURE — 93010 ELECTROCARDIOGRAM REPORT: CPT

## 2019-10-09 PROCEDURE — 81001 URINALYSIS AUTO W/SCOPE: CPT

## 2019-10-09 PROCEDURE — 87086 URINE CULTURE/COLONY COUNT: CPT

## 2019-10-09 PROCEDURE — 87040 BLOOD CULTURE FOR BACTERIA: CPT

## 2019-10-09 PROCEDURE — 87631 RESP VIRUS 3-5 TARGETS: CPT

## 2019-10-09 PROCEDURE — 85027 COMPLETE CBC AUTOMATED: CPT

## 2019-10-09 PROCEDURE — 80307 DRUG TEST PRSMV CHEM ANLYZR: CPT

## 2019-10-09 PROCEDURE — 99284 EMERGENCY DEPT VISIT MOD MDM: CPT | Mod: 25

## 2019-10-09 PROCEDURE — 99284 EMERGENCY DEPT VISIT MOD MDM: CPT

## 2019-10-09 PROCEDURE — 83605 ASSAY OF LACTIC ACID: CPT

## 2019-10-09 PROCEDURE — 80053 COMPREHEN METABOLIC PANEL: CPT

## 2019-10-09 PROCEDURE — 71045 X-RAY EXAM CHEST 1 VIEW: CPT

## 2019-10-09 PROCEDURE — 96374 THER/PROPH/DIAG INJ IV PUSH: CPT

## 2019-10-09 PROCEDURE — 93005 ELECTROCARDIOGRAM TRACING: CPT

## 2019-10-09 RX ORDER — AZITHROMYCIN 500 MG/1
1 TABLET, FILM COATED ORAL
Qty: 4 | Refills: 0
Start: 2019-10-09 | End: 2019-10-12

## 2019-10-09 RX ORDER — SODIUM CHLORIDE 9 MG/ML
1000 INJECTION INTRAMUSCULAR; INTRAVENOUS; SUBCUTANEOUS ONCE
Refills: 0 | Status: COMPLETED | OUTPATIENT
Start: 2019-10-09 | End: 2019-10-09

## 2019-10-09 RX ORDER — PIPERACILLIN AND TAZOBACTAM 4; .5 G/20ML; G/20ML
3.38 INJECTION, POWDER, LYOPHILIZED, FOR SOLUTION INTRAVENOUS ONCE
Refills: 0 | Status: COMPLETED | OUTPATIENT
Start: 2019-10-09 | End: 2019-10-09

## 2019-10-09 RX ORDER — NALOXONE HYDROCHLORIDE 4 MG/.1ML
0.4 SPRAY NASAL ONCE
Refills: 0 | Status: COMPLETED | OUTPATIENT
Start: 2019-10-09 | End: 2019-10-09

## 2019-10-09 RX ADMIN — PIPERACILLIN AND TAZOBACTAM 200 GRAM(S): 4; .5 INJECTION, POWDER, LYOPHILIZED, FOR SOLUTION INTRAVENOUS at 17:53

## 2019-10-09 RX ADMIN — SODIUM CHLORIDE 1000 MILLILITER(S): 9 INJECTION INTRAMUSCULAR; INTRAVENOUS; SUBCUTANEOUS at 17:37

## 2019-10-09 NOTE — ED PROVIDER NOTE - PATIENT PORTAL LINK FT
You can access the FollowMyHealth Patient Portal offered by [facility] by registering at the following website: [email or web address]. By joining [facility]’s FollowMyHealth portal, you will also be able to view your health information using other applications (apps) compatible with our system.

## 2019-10-09 NOTE — CHART NOTE - NSCHARTNOTEFT_GEN_A_CORE
Social work note:  received call from MD Villafuerte regarding sending pt back to [facility]. Pt was accepted by MD Shah. Worker called admissions at [facility] to find out what unit pt is from - worker spoke with Siobhan in admissions who stated pt was never admitted to [facility]. Pt is coming for rehab only and no psych treatment.  As per Soibhan in admission pt is not on any legal status. Pt is to be transported to [facility]. Worker will arrange for transportation. NEAF to be completed and be left with paula clerk.

## 2019-10-09 NOTE — ED PROVIDER NOTE - NSFOLLOWUPINSTRUCTIONS_ED_ALL_ED_FT
You are advised to please follow up with your primary care doctor within the next 24 hours and return to the Emergency Department for worsening symptoms or any other concerns.  Your doctor may call [phone] to follow up on the specific results of the tests performed today in the emergency department.

## 2019-10-09 NOTE — ED ADULT NURSE REASSESSMENT NOTE - NS ED NURSE REASSESS COMMENT FT1
Received report from PASTORA Dove. Pt aox3, breathing equal and unlabored, color good. Pt awaiting transport back to [facility]. Pt POC explained and verbalized understanding. Pt has no medical complaints at this time.

## 2019-10-09 NOTE — ED ADULT TRIAGE NOTE - CHIEF COMPLAINT QUOTE
Patient arrived via EMS, awake alert, and oriented times 3, breathing unlabored.  Patient sleepy, recently given methadone, easily arrousable.  Patient has no complaints at this time.  Sent to ED by [facility], due to " low grade temp", and tachycardic.

## 2019-10-09 NOTE — ED ADULT NURSE NOTE - OBJECTIVE STATEMENT
24 yof presents to ed for medical clearance for [facility]. pt is on methadone had used iv heroin for about 5 years multiple scars -obtain iv via [facility] guidance. denies pain denies fever/chills. tachycardic.

## 2019-10-09 NOTE — ED PROVIDER NOTE - OBJECTIVE STATEMENT
24yoF; with pmh signif for Hepatitis C, IVDU, in Methadone Program (received 110 mg today prior to her  sending her to [facility]); now p/w tachycardia and low grade fever from [facility].  patient states she has been having mild malaise for past few days. denies cough. denies headache. denies n/v/d. denies abd pain. denies dysuria.  PMH: Hepatitis C  SOCIAL: hx of heroin abuse, social EtOH

## 2019-10-10 LAB
CULTURE RESULTS: SIGNIFICANT CHANGE UP
SPECIMEN SOURCE: SIGNIFICANT CHANGE UP

## 2019-10-14 LAB
CULTURE RESULTS: SIGNIFICANT CHANGE UP
CULTURE RESULTS: SIGNIFICANT CHANGE UP
SPECIMEN SOURCE: SIGNIFICANT CHANGE UP
SPECIMEN SOURCE: SIGNIFICANT CHANGE UP

## 2019-12-11 ENCOUNTER — APPOINTMENT (OUTPATIENT)
Dept: HEPATOLOGY | Facility: CLINIC | Age: 24
End: 2019-12-11
Payer: COMMERCIAL

## 2019-12-11 VITALS
WEIGHT: 155 LBS | HEIGHT: 64 IN | RESPIRATION RATE: 14 BRPM | HEART RATE: 87 BPM | BODY MASS INDEX: 26.46 KG/M2 | TEMPERATURE: 98.8 F | SYSTOLIC BLOOD PRESSURE: 123 MMHG | DIASTOLIC BLOOD PRESSURE: 71 MMHG

## 2019-12-11 PROCEDURE — 99214 OFFICE O/P EST MOD 30 MIN: CPT

## 2019-12-11 RX ORDER — GLECAPREVIR AND PIBRENTASVIR 40; 100 MG/1; MG/1
100-40 TABLET, FILM COATED ORAL
Qty: 168 | Refills: 0 | Status: COMPLETED | COMMUNITY
Start: 2019-12-11 | End: 2020-02-05

## 2019-12-11 NOTE — ASSESSMENT
[FreeTextEntry1] : 23 yo F with chronic HCV (genotype 1a, treatment naive), with elevated liver enzymes but no evidence of cirrhosis, with minimal (F0-1) fibrosis based on FibroScan done in 9/2019.\par \par Abdominal US to rule out cirrhosis and HCC not yet completed and was re-ordered today. She said she would get it done.\par \par I have discussed with her at length regarding hepatitis C. I reviewed the natural history, modes of transmission, evaluation, prognosis, and possible treatment. I have discussed the staging of the disease including her FibroScan results.\par \par I have reviewed with her currently approved, potential treatment regimens, potential durations of therapy, and potential side effects. She is interested in these therapies. The exact choice of regimen will depend in part on insurance coverage, but I will prescribe an 8-week course of Mavyret. I have discussed with her the importance of 100% medication adherence once therapy is initiated in order to avoid formation of resistant viral strains and maximize the likelihood of achieving a sustained virologic response (SVR).\par \par I have recommended that her family and sexual partners be evaluated for hepatitis C as appropriate. She was also advised that the hepatitis C antibody will likely stay positive life-long but does not confer protection from possible re-infection by HCV.\par \par She is non-immune to HAV and HBV and was advised to complete the vaccination series for both, here or elsewhere.\par \par She will return for follow-up in 3 months.

## 2019-12-11 NOTE — HISTORY OF PRESENT ILLNESS
[Needlestick Exposure] : no needlestick exposure [Infected Sexual Partner] : infected sexual partner [IV Drug Use] : IV drug use [Tattoo] : tattoo(s) [Body Piercing] : body piercing [Hemodialysis] : no hemodialysis [Transfusion before 1992] : no transfusion before 1992 [Transplant before 1992] : no transplant before 1992 [Alcohol Abuse] : no alcohol abuse [Autoimmune Disorder] : no autoimmune disorder [Travel to Endemic Area] : no travel to an endemic area [Household Contact to HBV] : no household contact to HBV [Occupational Exposure] : no occupational exposure [de-identified] : Ms. Oakes is a 23 yo F with a history of IVDU on methadone treatment, who is here for follow-up of chronic HCV (genotype 1a, treatment naive, with HCV RNA 1,509,285 IU/mL on 3/29/19), without HIV or HBV co-infection and without known cirrhosis.\par \par She was first diagnosed with HCV in early 2018 when she was tested in methadone clinic. She thinks she acquired the virus from prior IVDU or from her boyfriend, who was successfully treated for HCV in the recent past. She has 1 professional tattoo on her right calf. Ears are pierced. No alcohol use. Has an IUD and uses condoms as well.\par \par She was last seen on 3/29/19. She underwent FibroScan on 9/17/19 that showed liver stiffness of 4.9 kPA (consistent with F0-1 fibrosis) and CAP score of 228 dB/m (consistent with mild S1 steatosis). She has not had an abdominal sonogram done yet. She is currently feeling well with no complaints. She is interested in starting HCV treatment.

## 2019-12-12 LAB
AFP-TM SERPL-MCNC: 3.2 NG/ML
ALBUMIN SERPL ELPH-MCNC: 4.1 G/DL
ALP BLD-CCNC: 232 U/L
ALT SERPL-CCNC: 63 U/L
ANION GAP SERPL CALC-SCNC: 14 MMOL/L
AST SERPL-CCNC: 87 U/L
BILIRUB SERPL-MCNC: 0.5 MG/DL
BUN SERPL-MCNC: 7 MG/DL
CALCIUM SERPL-MCNC: 9.7 MG/DL
CHLORIDE SERPL-SCNC: 103 MMOL/L
CO2 SERPL-SCNC: 23 MMOL/L
CREAT SERPL-MCNC: 0.85 MG/DL
GLUCOSE SERPL-MCNC: 81 MG/DL
HCV RNA SERPL NAA DL=5-ACNC: ABNORMAL IU/ML
HCV RNA SERPL NAA+PROBE-LOG IU: 6.6 LOG10IU/ML
INR PPP: 0.96 RATIO
POTASSIUM SERPL-SCNC: 5.1 MMOL/L
PROT SERPL-MCNC: 7.6 G/DL
PT BLD: 10.9 SEC
SODIUM SERPL-SCNC: 140 MMOL/L

## 2020-02-11 ENCOUNTER — APPOINTMENT (OUTPATIENT)
Dept: HEPATOLOGY | Facility: CLINIC | Age: 25
End: 2020-02-11

## 2020-03-01 ENCOUNTER — FORM ENCOUNTER (OUTPATIENT)
Age: 25
End: 2020-03-01

## 2020-03-02 ENCOUNTER — APPOINTMENT (OUTPATIENT)
Dept: ULTRASOUND IMAGING | Facility: CLINIC | Age: 25
End: 2020-03-02
Payer: COMMERCIAL

## 2020-03-02 ENCOUNTER — OUTPATIENT (OUTPATIENT)
Dept: OUTPATIENT SERVICES | Facility: HOSPITAL | Age: 25
LOS: 1 days | End: 2020-03-02
Payer: MEDICAID

## 2020-03-02 DIAGNOSIS — B18.2 CHRONIC VIRAL HEPATITIS C: ICD-10-CM

## 2020-03-02 PROCEDURE — 76700 US EXAM ABDOM COMPLETE: CPT | Mod: 26

## 2020-03-02 PROCEDURE — 76700 US EXAM ABDOM COMPLETE: CPT

## 2020-03-10 ENCOUNTER — APPOINTMENT (OUTPATIENT)
Dept: HEPATOLOGY | Facility: CLINIC | Age: 25
End: 2020-03-10
Payer: COMMERCIAL

## 2020-03-10 VITALS
TEMPERATURE: 98.4 F | BODY MASS INDEX: 26.29 KG/M2 | SYSTOLIC BLOOD PRESSURE: 128 MMHG | DIASTOLIC BLOOD PRESSURE: 84 MMHG | OXYGEN SATURATION: 97 % | HEART RATE: 48 BPM | WEIGHT: 154 LBS | HEIGHT: 64 IN

## 2020-03-10 DIAGNOSIS — R74.8 ABNORMAL LEVELS OF OTHER SERUM ENZYMES: ICD-10-CM

## 2020-03-10 PROCEDURE — 99213 OFFICE O/P EST LOW 20 MIN: CPT

## 2020-03-10 NOTE — ASSESSMENT
[FreeTextEntry1] : 24 yo F with chronic HCV (genotype 1a, treatment naive), with elevated liver enzymes but no evidence of cirrhosis, with minimal (F0-1) fibrosis based on FibroScan done in 9/2019. US abdomen 3/2020 showed normal liver and spleen, and a small frank hepatis lymph node - likely due to hepatitis c.\par \par \par I have reviewed with her currently approved, potential treatment regimens, potential durations of therapy, and potential side effects and discussed that the exact choice of regimen will depend in part on insurance coverage. I have discussed with her the importance of 100% medication adherence once therapy is initiated in order to avoid formation of resistant viral strains and maximize the likelihood of achieving a sustained virologic response (SVR). She has no upcoming surgery or travel.\par \par Plan:\par - return in 6 weeks. Our pharmacist will order labs with timing based on start of hepatitis c treatment.

## 2020-03-10 NOTE — HISTORY OF PRESENT ILLNESS
[Infected Sexual Partner] : infected sexual partner [IV Drug Use] : IV drug use [Tattoo] : tattoo(s) [Body Piercing] : body piercing [Needlestick Exposure] : no needlestick exposure [Hemodialysis] : no hemodialysis [Transfusion before 1992] : no transfusion before 1992 [Transplant before 1992] : no transplant before 1992 [Alcohol Abuse] : no alcohol abuse [Autoimmune Disorder] : no autoimmune disorder [Household Contact to HBV] : no household contact to HBV [Travel to Endemic Area] : no travel to an endemic area [Occupational Exposure] : no occupational exposure [de-identified] : - 3/10/20: initial visit with me: had US abdomen a couple of days ago. Has not heard from our office, has not started treatment. \par \par - visit with Dr. Faith: Ms. Oakes is a 23 yo F with a history of IVDU on methadone treatment, who is here for follow-up of chronic HCV (genotype 1a, treatment naive, with HCV RNA 1,509,285 IU/mL on 3/29/19), without HIV or HBV co-infection and without known cirrhosis.\par \par She was first diagnosed with HCV in early 2018 when she was tested in methadone clinic. She thinks she acquired the virus from prior IVDU or from her boyfriend, who was successfully treated for HCV in the recent pafst. She has 1 professional tattoo on her right calf. Ears are pierced. No alcohol use. Has an IUD and uses condoms as well.\par \par Workup:\par - 3/10/20 US abdomen: small lymph node frank hepatis, otherwise normal exam.\par - FibroScan on 9/17/19 that showed liver stiffness of 4.9 kPA (consistent with F0-1 fibrosis) and CAP score of 228 dB/m (consistent with mild S1 steatosis). She has not had an abdominal sonogram done yet. She is currently feeling well with no complaints. She is interested in starting HCV treatment.

## 2020-03-13 ENCOUNTER — APPOINTMENT (OUTPATIENT)
Dept: HEPATOLOGY | Facility: CLINIC | Age: 25
End: 2020-03-13

## 2020-03-17 ENCOUNTER — APPOINTMENT (OUTPATIENT)
Dept: HEPATOLOGY | Facility: CLINIC | Age: 25
End: 2020-03-17

## 2020-04-14 NOTE — ED ADULT TRIAGE NOTE - CHIEF COMPLAINT QUOTE
Addended by: MACY VALLE on: 4/14/2020 07:17 AM     Modules accepted: Orders     pt. w/ hx. heroin abuse was brought in from [address] [place] [address] . Pt. was visiting family member and stated she is actively withdrawing. Pt. states last time she used heroin was about 12 hrs ago. Pt. in triage very sleepy , but responds to verbal and painful stimuli . Pt. fs in triage 109, ekg in progress.

## 2020-04-25 ENCOUNTER — APPOINTMENT (OUTPATIENT)
Dept: HEPATOLOGY | Facility: CLINIC | Age: 25
End: 2020-04-25

## 2020-05-02 ENCOUNTER — APPOINTMENT (OUTPATIENT)
Dept: HEPATOLOGY | Facility: CLINIC | Age: 25
End: 2020-05-02
Payer: SELF-PAY

## 2020-05-02 DIAGNOSIS — F11.20 OPIOID DEPENDENCE, UNCOMPLICATED: ICD-10-CM

## 2020-05-02 DIAGNOSIS — Z78.9 OTHER SPECIFIED HEALTH STATUS: ICD-10-CM

## 2020-05-02 PROCEDURE — 99214 OFFICE O/P EST MOD 30 MIN: CPT | Mod: 95

## 2020-05-02 NOTE — PHYSICAL EXAM
[Scleral Icterus] : No Scleral Icterus [General Appearance - Alert] : alert [General Appearance - In No Acute Distress] : in no acute distress [General Appearance - Well Developed] : well developed [General Appearance - Well Nourished] : well nourished [General Appearance - Well-Appearing] : healthy appearing [] : no respiratory distress [Sclera] : the sclera and conjunctiva were normal [Respiration, Rhythm And Depth] : normal respiratory rhythm and effort [Oriented To Time, Place, And Person] : oriented to person, place, and time

## 2020-05-02 NOTE — ASSESSMENT
[FreeTextEntry1] : This patient has history of drug addiction and is currently sober.  She has hepatitis C, genotype 1A, infection with elevated liver tests, without evidence of liver mass.  I will proceed with hepatitis C, treatment.  The patient will have repeat laboratory testing and will then interact with her office to secure.  Her medication.  I have explained to the patient the chance of success with treatment and need for compliance.  She should stop all alcohol use.  The patient is also noted that she will not be immune to hepatitis C following treatment.  The patient is instructed to call the office in several weeks.  If she has not been contacted and I will do her a followup appointment in 2 months.

## 2020-05-02 NOTE — HISTORY OF PRESENT ILLNESS
[Home] : at home, [unfilled] , at the time of the visit. [Other Location: e.g. Home (Enter Location, City,State)___] : at [unfilled] [Patient] : the patient [Self] : self [Needlestick Exposure] : no needlestick exposure [Tattoo] : tattoo(s) [IV Drug Use] : IV drug use [Body Piercing] : body piercing [Transplant before 1992] : no transplant before 1992 [Hemodialysis] : no hemodialysis [Transfusion before 1992] : no transfusion before 1992 [Alcohol Abuse] : no alcohol abuse [Incarceration] : no incarceration [Autoimmune Disorder] : no autoimmune disorder [Household Contact to HBV] : no household contact to HBV [Travel to Endemic Area] : no travel to an endemic area [Occupational Exposure] : no occupational exposure [Moderate] : moderate in severity [Cocaine Use] : no cocaine use [Unchanged] : unchanged [Fatigue] : fatigue stable [Malaise] : denies malaise [Fever] : denies fever [Diffuse Joint Pain (Arthralgias)] : denies arthralgias [Muscle Aches, Generalized (Myalgias)] : denies myalgias [Yellow Skin Or Eyes (Jaundice)] : denies jaundice [Urine Tests Nonspecific Abnormal Findings Biliuria] : denies dark urine [Abdominal Pain] : denies abdominal pain [Light Colored Bowel Movement (Acholic Stools)] : denies pale stools [Skin: Rash] : denies rash [Insomnia] : insomnia stable [Shortness Of Breath] : denies shortness of breath [Itching (Pruritus)] : denies pruritus [Wt Gain ___ Lbs] : recent [unfilled] ~Upound(s) weight gain [Wt Loss ___ Lbs] : no recent weight loss [de-identified] : This patient has history of IV drug use, and acquired, hepatitis C., genotype 1A, the patient was diagnosed in 2018.  Laboratory tests show elevation of aminotransferase values and alkaline phosphatase.  Fibroscan showed F0-one fibrosis.  The patient does have documented hepatitis C, RNA, viral load of 1.5 million.  In March of 2019.  The patient has had an abdominal ultrasound in March of 2020 showing no evidence of liver mass.  A normal-appearing liver and bile duct.  The patient desires treatment, but has not yet initiated therapy. \par \par The patient's major symptoms related to anxiety and insomnia, and she received medication through her methadone clinic.  The patient has been on a stable dose of methadone of 120 mg daily for the past several years.  She states that she has been sober without any slips for more than a year and receives her methadone on a monthly basis.  The patient does smoke cigarettes.  She drinks alcohol occasionally but not regularly.\par \par The patient's weight has increased about 50 pounds over the past year.  That she relates to her eating habits associated with anxiety and lack of exercise.\par \par \par

## 2020-05-02 NOTE — REVIEW OF SYSTEMS
[Fever] : no fever [Recent Weight Gain (___ Lbs)] : recent [unfilled] ~Ulb weight gain [Feeling Tired] : feeling tired [Chills] : no chills [Recent Weight Loss (___ Lbs)] : no recent weight loss [Anxiety] : anxiety [Negative] : Heme/Lymph

## 2021-09-27 ENCOUNTER — APPOINTMENT (OUTPATIENT)
Dept: INTERNAL MEDICINE | Facility: CLINIC | Age: 26
End: 2021-09-27

## 2021-09-30 ENCOUNTER — APPOINTMENT (OUTPATIENT)
Dept: HEPATOLOGY | Facility: CLINIC | Age: 26
End: 2021-09-30

## 2021-11-15 ENCOUNTER — LABORATORY RESULT (OUTPATIENT)
Age: 26
End: 2021-11-15

## 2021-11-15 ENCOUNTER — APPOINTMENT (OUTPATIENT)
Dept: HEPATOLOGY | Facility: CLINIC | Age: 26
End: 2021-11-15
Payer: MEDICAID

## 2021-11-15 VITALS
RESPIRATION RATE: 14 BRPM | HEIGHT: 64 IN | OXYGEN SATURATION: 98 % | SYSTOLIC BLOOD PRESSURE: 110 MMHG | BODY MASS INDEX: 22.36 KG/M2 | HEART RATE: 59 BPM | DIASTOLIC BLOOD PRESSURE: 67 MMHG | TEMPERATURE: 97.6 F | WEIGHT: 131 LBS

## 2021-11-15 PROCEDURE — 99214 OFFICE O/P EST MOD 30 MIN: CPT

## 2021-11-15 RX ORDER — GABAPENTIN 400 MG/1
CAPSULE ORAL
Refills: 0 | Status: DISCONTINUED | COMMUNITY
End: 2021-11-15

## 2021-11-15 RX ORDER — MIRTAZAPINE 30 MG/1
TABLET, FILM COATED ORAL
Refills: 0 | Status: DISCONTINUED | COMMUNITY
End: 2021-11-15

## 2021-11-15 RX ORDER — CLONAZEPAM 2 MG/1
2 TABLET ORAL
Refills: 0 | Status: ACTIVE | COMMUNITY

## 2021-11-15 RX ORDER — QUETIAPINE 400 MG/1
TABLET, FILM COATED ORAL
Refills: 0 | Status: DISCONTINUED | COMMUNITY
End: 2021-11-15

## 2021-11-15 RX ORDER — SERTRALINE HYDROCHLORIDE 25 MG/1
TABLET, FILM COATED ORAL
Refills: 0 | Status: DISCONTINUED | COMMUNITY
End: 2021-11-15

## 2021-11-15 RX ORDER — METHADONE HYDROCHLORIDE 40 MG/1
TABLET ORAL
Refills: 0 | Status: DISCONTINUED | COMMUNITY
End: 2021-11-15

## 2021-11-15 NOTE — HISTORY OF PRESENT ILLNESS
[FreeTextEntry1] : Ms. Oakes is a 26-year-old female with a history of IVDU on methadone treatment in the past, Depression, Anxiety, ADHD, and chronic HCV (genotype 1a, treatment naïve) who presents to the hepatology clinic to re-establish care. She previously was a no-show to clinic multiple times in the past. She was able come in sooner for an appointment or address her HCV due to personal issues/transportation problems. She has been clean from IVDU since 2019.\par \par She thinks she acquired the virus from prior IVDU or from her boyfriend, who was successfully treated for HCV in the recent past. She has 1 professional tattoo on her right calf. Ears are pierced. No alcohol use. She has no known cirrhosis. She does not have HBV co-infection based on prior labs.  She had an abdominal US on 03/02/20 that showed a normal liver and spleen. Her fibroscan from 09/17/19 scored a median liver stiffness of 4.9 kPa which is consistent with F0-F1 disease,  dB/m.\par \par She lost more than 20 lbs since the beginning of 2020 by modifying her diet and eating healthier. Her family is not aware of her Hep C status and she does not want them to be aware.

## 2021-11-15 NOTE — ASSESSMENT
[FreeTextEntry1] : Assessment and Plan: Ms. Oakes is a 26-year-old female with a history of IVDU on methadone treatment in the past, Depression, Anxiety, ADHD, and chronic HCV (genotype 1a, treatment naïve) who presents to the hepatology clinic to re-establish care. \par \par 1. Chronic HCV \par She denies any illicit drug use at this time. She reports she is committed to being treated. Discussed expectations, including compliance with treatment, blood draws, and follow up appointments. Will repeat labs now. Abdominal ultrasound ordered for HCC Screening and fibroscan ordered to stage Hep C fibrosis. \par \par Follow Up: 3 weeks\par \par Christo Wilson\par Nurse Practitioner \par Hepatology\par [facility] \par \par \par

## 2021-11-15 NOTE — PHYSICAL EXAM
[Scleral Icterus] : No Scleral Icterus [Spider Angioma] : No spider angioma(s) were observed [Abdominal  Ascites] : no ascites [Ascites Fluid Wave] : no ascites fluid wave [Asterixis] : no asterixis observed [Jaundice] : No jaundice [Hallucinations] : ~T no ~M hallucinations [Delusions] : no ~T delusions [General Appearance - Alert] : alert [Respiration, Rhythm And Depth] : normal respiratory rhythm and effort [Heart Rate And Rhythm] : heart rate was normal and rhythm regular [Edema] : there was no peripheral edema [Bowel Sounds] : normal bowel sounds [Abnormal Walk] : normal gait [Skin Color & Pigmentation] : normal skin color and pigmentation [Oriented To Time, Place, And Person] : oriented to person, place, and time

## 2021-11-15 NOTE — REVIEW OF SYSTEMS
[As Noted in HPI] : as noted in HPI [Fever] : no fever [Chills] : no chills [Feeling Poorly] : not feeling poorly [Feeling Tired] : not feeling tired [Recent Weight Loss (___ Lbs)] : recent [unfilled] ~Ulb weight loss [Negative] : Heme/Lymph

## 2021-11-16 LAB
AFP-TM SERPL-MCNC: 3.2 NG/ML
ALBUMIN SERPL ELPH-MCNC: 4.3 G/DL
ALP BLD-CCNC: 199 U/L
ALT SERPL-CCNC: 164 U/L
ANION GAP SERPL CALC-SCNC: 15 MMOL/L
AST SERPL-CCNC: 170 U/L
BASOPHILS # BLD AUTO: 0.06 K/UL
BASOPHILS NFR BLD AUTO: 0.9 %
BILIRUB SERPL-MCNC: 0.6 MG/DL
BUN SERPL-MCNC: 8 MG/DL
CALCIUM SERPL-MCNC: 9.8 MG/DL
CHLORIDE SERPL-SCNC: 103 MMOL/L
CO2 SERPL-SCNC: 24 MMOL/L
CREAT SERPL-MCNC: 0.9 MG/DL
EOSINOPHIL # BLD AUTO: 0.14 K/UL
EOSINOPHIL NFR BLD AUTO: 2.1 %
GLUCOSE SERPL-MCNC: 76 MG/DL
HBV CORE IGG+IGM SER QL: NONREACTIVE
HBV SURFACE AB SER QL: REACTIVE
HBV SURFACE AG SER QL: NONREACTIVE
HCT VFR BLD CALC: 48.8 %
HEPATITIS A IGG ANTIBODY: REACTIVE
HGB BLD-MCNC: 15.6 G/DL
IMM GRANULOCYTES NFR BLD AUTO: 0.4 %
INR PPP: 1.01 RATIO
LYMPHOCYTES # BLD AUTO: 2.81 K/UL
LYMPHOCYTES NFR BLD AUTO: 41.5 %
MAN DIFF?: NORMAL
MCHC RBC-ENTMCNC: 32 GM/DL
MCHC RBC-ENTMCNC: 32 PG
MCV RBC AUTO: 100.2 FL
MONOCYTES # BLD AUTO: 0.54 K/UL
MONOCYTES NFR BLD AUTO: 8 %
NEUTROPHILS # BLD AUTO: 3.19 K/UL
NEUTROPHILS NFR BLD AUTO: 47.1 %
PLATELET # BLD AUTO: 217 K/UL
POTASSIUM SERPL-SCNC: 5.3 MMOL/L
PROT SERPL-MCNC: 7.8 G/DL
PT BLD: 11.9 SEC
RBC # BLD: 4.87 M/UL
RBC # FLD: 13.2 %
SODIUM SERPL-SCNC: 142 MMOL/L
WBC # FLD AUTO: 6.77 K/UL

## 2021-11-17 ENCOUNTER — NON-APPOINTMENT (OUTPATIENT)
Age: 26
End: 2021-11-17

## 2021-11-17 ENCOUNTER — APPOINTMENT (OUTPATIENT)
Dept: INTERNAL MEDICINE | Facility: CLINIC | Age: 26
End: 2021-11-17
Payer: MEDICAID

## 2021-11-17 VITALS
DIASTOLIC BLOOD PRESSURE: 68 MMHG | BODY MASS INDEX: 22.2 KG/M2 | WEIGHT: 130 LBS | HEART RATE: 63 BPM | HEIGHT: 64 IN | RESPIRATION RATE: 14 BRPM | TEMPERATURE: 98.1 F | SYSTOLIC BLOOD PRESSURE: 108 MMHG | OXYGEN SATURATION: 97 %

## 2021-11-17 DIAGNOSIS — Z78.9 OTHER SPECIFIED HEALTH STATUS: ICD-10-CM

## 2021-11-17 DIAGNOSIS — F41.9 ANXIETY DISORDER, UNSPECIFIED: ICD-10-CM

## 2021-11-17 DIAGNOSIS — Z00.00 ENCOUNTER FOR GENERAL ADULT MEDICAL EXAMINATION W/OUT ABNORMAL FINDINGS: ICD-10-CM

## 2021-11-17 DIAGNOSIS — L90.5 SCAR CONDITIONS AND FIBROSIS OF SKIN: ICD-10-CM

## 2021-11-17 DIAGNOSIS — F32.A ANXIETY DISORDER, UNSPECIFIED: ICD-10-CM

## 2021-11-17 DIAGNOSIS — F17.210 NICOTINE DEPENDENCE, CIGARETTES, UNCOMPLICATED: ICD-10-CM

## 2021-11-17 LAB
HCV RNA SERPL NAA DL=5-ACNC: ABNORMAL IU/ML
HCV RNA SERPL NAA+PROBE-LOG IU: 6.25 LOG10IU/ML

## 2021-11-17 PROCEDURE — 99203 OFFICE O/P NEW LOW 30 MIN: CPT

## 2021-11-17 RX ORDER — METHADONE HYDROCHLORIDE 5 MG/1
TABLET ORAL
Refills: 0 | Status: ACTIVE | COMMUNITY

## 2021-11-17 NOTE — PHYSICAL EXAM
[No Acute Distress] : no acute distress [Normal Sclera/Conjunctiva] : normal sclera/conjunctiva [PERRL] : pupils equal round and reactive to light [EOMI] : extraocular movements intact [Normal Outer Ear/Nose] : the outer ears and nose were normal in appearance [No Lymphadenopathy] : no lymphadenopathy [Supple] : supple [No Respiratory Distress] : no respiratory distress  [No Accessory Muscle Use] : no accessory muscle use [Clear to Auscultation] : lungs were clear to auscultation bilaterally [Regular Rhythm] : with a regular rhythm [Normal S1, S2] : normal S1 and S2 [No Edema] : there was no peripheral edema [Soft] : abdomen soft [Non Tender] : non-tender [Non-distended] : non-distended [Normal Bowel Sounds] : normal bowel sounds

## 2021-11-17 NOTE — HEALTH RISK ASSESSMENT
[1/2 of Days or More (2)] : 4.) Feeling tired or having little energy? Half the days or more [Several Days (1)] : 7.) Trouble concentrating on things, such as reading a newspaper or watching television? Several days [Not at All (0)] : 9.) Thoughts that you would be off dead or of hurting yourself in some way? Not at all [Mild] : severity of depression is mild [PHQ-9 Positive] : PHQ-9 Positive [I have developed a follow-up plan documented below in the note.] : I have developed a follow-up plan documented below in the note. [[id number]] : 7

## 2021-11-17 NOTE — HISTORY OF PRESENT ILLNESS
[FreeTextEntry8] : 26F presents for referral for dermatology due to scarring on arms from hx of IVDU. \par

## 2021-11-17 NOTE — ASSESSMENT
[FreeTextEntry1] : Scarring: Referred to Dr Carlson (derm). \par Depression: Follows with Dr Chavez. On clonazepam. \par

## 2021-11-17 NOTE — REVIEW OF SYSTEMS
[Anxiety] : anxiety [Depression] : depression [Fever] : no fever [Chills] : no chills [Night Sweats] : no night sweats [Earache] : no earache [Nasal Discharge] : no nasal discharge [Sore Throat] : no sore throat [Chest Pain] : no chest pain [Palpitations] : no palpitations [Lower Ext Edema] : no lower extremity edema [Shortness Of Breath] : no shortness of breath [Wheezing] : no wheezing [Cough] : no cough [Dyspnea on Exertion] : no dyspnea on exertion [Abdominal Pain] : no abdominal pain [Nausea] : no nausea [Constipation] : no constipation [Diarrhea] : diarrhea [Vomiting] : no vomiting [Dysuria] : no dysuria [Muscle Weakness] : no muscle weakness [Muscle Pain] : no muscle pain [Itching] : no itching [Skin Rash] : no skin rash [Headache] : no headache [Dizziness] : no dizziness [Suicidal] : not suicidal [Easy Bleeding] : no easy bleeding [Easy Bruising] : no easy bruising [Swollen Glands] : no swollen glands

## 2021-11-19 LAB — HCV GENTYP BLD NAA+PROBE: NORMAL

## 2021-11-29 ENCOUNTER — APPOINTMENT (OUTPATIENT)
Dept: HEPATOLOGY | Facility: CLINIC | Age: 26
End: 2021-11-29

## 2021-12-06 ENCOUNTER — APPOINTMENT (OUTPATIENT)
Dept: HEPATOLOGY | Facility: CLINIC | Age: 26
End: 2021-12-06

## 2021-12-13 ENCOUNTER — APPOINTMENT (OUTPATIENT)
Dept: HEPATOLOGY | Facility: CLINIC | Age: 26
End: 2021-12-13
Payer: MEDICAID

## 2021-12-13 PROCEDURE — 91200 LIVER ELASTOGRAPHY: CPT

## 2021-12-27 ENCOUNTER — APPOINTMENT (OUTPATIENT)
Dept: HEPATOLOGY | Facility: CLINIC | Age: 26
End: 2021-12-27
Payer: MEDICAID

## 2021-12-27 VITALS
SYSTOLIC BLOOD PRESSURE: 118 MMHG | WEIGHT: 130 LBS | OXYGEN SATURATION: 97 % | RESPIRATION RATE: 14 BRPM | HEART RATE: 69 BPM | TEMPERATURE: 98 F | HEIGHT: 64 IN | BODY MASS INDEX: 22.2 KG/M2 | DIASTOLIC BLOOD PRESSURE: 78 MMHG

## 2021-12-27 DIAGNOSIS — B18.2 CHRONIC VIRAL HEPATITIS C: ICD-10-CM

## 2021-12-27 PROCEDURE — 99214 OFFICE O/P EST MOD 30 MIN: CPT

## 2021-12-27 NOTE — PHYSICAL EXAM
[General Appearance - Alert] : alert [Respiration, Rhythm And Depth] : normal respiratory rhythm and effort [Heart Rate And Rhythm] : heart rate was normal and rhythm regular [Edema] : there was no peripheral edema [Abdomen Soft] : soft [Abnormal Walk] : normal gait [Skin Color & Pigmentation] : normal skin color and pigmentation [Oriented To Time, Place, And Person] : oriented to person, place, and time [Scleral Icterus] : No Scleral Icterus [Spider Angioma] : No spider angioma(s) were observed [Abdominal  Ascites] : no ascites [Ascites Fluid Wave] : no ascites fluid wave [Asterixis] : no asterixis observed [Jaundice] : No jaundice [Hallucinations] : ~T no ~M hallucinations [Delusions] : no ~T delusions

## 2021-12-27 NOTE — HISTORY OF PRESENT ILLNESS
[FreeTextEntry1] : Ms. Oakes is a 26-year-old female with a history of IVDU on methadone treatment (145mg currently), Depression, Anxiety, ADHD, and chronic HCV (genotype 1a, treatment naïve) who presents to the hepatology clinic for follow up. She previously was a no-show to clinic multiple times in the past. She was able come in sooner for an appointment or address her HCV due to personal issues/transportation problems. \par \par She thinks she acquired the virus from prior IVDU or from her ex-boyfriend. She has 1 professional tattoo on her right calf. Ears are pierced. She has no known cirrhosis. She does not have HBV co-infection based on prior labs. She had an abdominal US on 03/02/20 that showed a normal liver and spleen. Her fibroscan from 09/17/19 scored a median liver stiffness of 4.9 kPa which is consistent with F0-F1 disease,  dB/m. Repeat fibroscan from 12/13/21 scored a median liver stiffness of 7.1 kPa which is consistent with F0-F1 disease,  dB/m. Her family is not aware of her Hep C status and she does not want them to be aware. \par \par Since her last visit on 11/15/21, patient reports she is doing well. She is eager to start HCV treatment. \par

## 2021-12-27 NOTE — ASSESSMENT
[FreeTextEntry1] : Assessment and Plan: Ms. Oakes is a 26-year-old female with a history of IVDU on methadone treatment in the past, Depression, Anxiety, ADHD, and chronic HCV (genotype 1a, treatment naïve) who presents to the hepatology clinic for follow up.  \par \par 1. Chronic HCV \par Discussed her recent labs and imaging. She reports she is committed to being treated. Discussed expectations, including compliance with treatment, blood draws, and follow up appointments. Abdominal ultrasound previously ordered for HCC screening- patient reminded to repeat ultrasound now. Will speak to the HCV treatment regarding treatment changes in light of the recent changes with her insurance plan.\par \par 2. Health Maintenance \par Immunizations: Immune to HAV, Immune to HBV\par \par Follow Up: 2 months \par \par Christo Wilson\par Nurse Practitioner \par Hepatology\par [facility] \par \par

## 2021-12-28 ENCOUNTER — APPOINTMENT (OUTPATIENT)
Dept: ULTRASOUND IMAGING | Facility: CLINIC | Age: 26
End: 2021-12-28
Payer: MEDICAID

## 2021-12-28 ENCOUNTER — NON-APPOINTMENT (OUTPATIENT)
Age: 26
End: 2021-12-28

## 2021-12-28 PROCEDURE — 76700 US EXAM ABDOM COMPLETE: CPT

## 2022-08-01 NOTE — ED PROVIDER NOTE - SEVERITY
Colonoscopy   WHAT YOU NEED TO KNOW:   A colonoscopy is a procedure to examine the inside of your colon (intestine) with a scope  Polyps or tissue growths may have been removed during your colonoscopy  It is normal to feel bloated and to have some abdominal discomfort  You should be passing gas  If you have hemorrhoids or you had polyps removed, you may have a small amount of bleeding  DISCHARGE INSTRUCTIONS:   Seek care immediately if:   You have sudden, severe abdominal pain  You have problems swallowing  You have a large amount of black, sticky bowel movements or blood in your bowel movements  You have sudden trouble breathing  You feel weak, lightheaded, or faint or your heart beats faster than normal for you  Contact your healthcare provider if:   You have a fever and chills  You have nausea or are vomiting  Your abdomen is bloated or feels full and hard  You have abdominal pain  You have black, sticky bowel movements or blood in your bowel movements  You have not had a bowel movement for 3 days after your procedure  You have rash or hives  You have questions or concerns about your procedure  Activity:   Do not lift, strain, or run for 24 hours after your procedure  Rest after your procedure  You have been given medicine to relax you  Do not drive or make important decisions until the day after your procedure  Return to your normal activity as directed  Relieve gas and discomfort from bloating by lying on your right side with a heating pad on your abdomen  You may need to take short walks to help the gas move out  Eat small meals until bloating is relieved  Follow up with your healthcare provider as directed: Write down your questions so you remember to ask them during your visits  If you take a blood thinner, please review the specific instructions from your endoscopist about when you should resume it   These can be found in the Recommendation and Your Medication list sections of this After Visit Summary  PAIN SCALE 6 OF 10.

## 2022-10-25 NOTE — ED PROVIDER NOTE - MUSCULOSKELETAL NEGATIVE STATEMENT, MLM
Denies changes to skin, bruising.
no back pain, no gout, no musculoskeletal pain, no neck pain, and no weakness.

## 2022-11-23 ENCOUNTER — INPATIENT (INPATIENT)
Facility: HOSPITAL | Age: 27
LOS: 11 days | Discharge: HOME | End: 2022-12-05
Attending: PSYCHIATRY & NEUROLOGY | Admitting: PSYCHIATRY & NEUROLOGY
Payer: MEDICAID

## 2022-11-23 VITALS
SYSTOLIC BLOOD PRESSURE: 130 MMHG | TEMPERATURE: 98 F | HEART RATE: 75 BPM | RESPIRATION RATE: 14 BRPM | DIASTOLIC BLOOD PRESSURE: 88 MMHG | OXYGEN SATURATION: 100 %

## 2022-11-23 DIAGNOSIS — F14.20 COCAINE DEPENDENCE, UNCOMPLICATED: ICD-10-CM

## 2022-11-23 DIAGNOSIS — F11.20 OPIOID DEPENDENCE, UNCOMPLICATED: ICD-10-CM

## 2022-11-23 DIAGNOSIS — F33.2 MAJOR DEPRESSIVE DISORDER, RECURRENT SEVERE WITHOUT PSYCHOTIC FEATURES: ICD-10-CM

## 2022-11-23 DIAGNOSIS — F32.9 MAJOR DEPRESSIVE DISORDER, SINGLE EPISODE, UNSPECIFIED: ICD-10-CM

## 2022-11-23 DIAGNOSIS — F41.1 GENERALIZED ANXIETY DISORDER: ICD-10-CM

## 2022-11-23 LAB
ALBUMIN SERPL ELPH-MCNC: 3.3 G/DL — SIGNIFICANT CHANGE UP (ref 3.3–5)
ALP SERPL-CCNC: 72 U/L — SIGNIFICANT CHANGE UP (ref 40–120)
ALT FLD-CCNC: 42 U/L — HIGH (ref 4–33)
AMPHET UR-MCNC: NEGATIVE — SIGNIFICANT CHANGE UP
ANION GAP SERPL CALC-SCNC: 8 MMOL/L — SIGNIFICANT CHANGE UP (ref 7–14)
APAP SERPL-MCNC: <10 UG/ML — LOW (ref 15–25)
APPEARANCE UR: ABNORMAL
AST SERPL-CCNC: 55 U/L — HIGH (ref 4–32)
BACTERIA # UR AUTO: ABNORMAL
BARBITURATES UR SCN-MCNC: NEGATIVE — SIGNIFICANT CHANGE UP
BASOPHILS # BLD AUTO: 0.04 K/UL — SIGNIFICANT CHANGE UP (ref 0–0.2)
BASOPHILS NFR BLD AUTO: 0.6 % — SIGNIFICANT CHANGE UP (ref 0–2)
BENZODIAZ UR-MCNC: NEGATIVE — SIGNIFICANT CHANGE UP
BILIRUB SERPL-MCNC: 0.2 MG/DL — SIGNIFICANT CHANGE UP (ref 0.2–1.2)
BILIRUB UR-MCNC: NEGATIVE — SIGNIFICANT CHANGE UP
BUN SERPL-MCNC: 8 MG/DL — SIGNIFICANT CHANGE UP (ref 7–23)
CALCIUM SERPL-MCNC: 8.7 MG/DL — SIGNIFICANT CHANGE UP (ref 8.4–10.5)
CHLORIDE SERPL-SCNC: 107 MMOL/L — SIGNIFICANT CHANGE UP (ref 98–107)
CO2 SERPL-SCNC: 27 MMOL/L — SIGNIFICANT CHANGE UP (ref 22–31)
COCAINE METAB.OTHER UR-MCNC: NEGATIVE — SIGNIFICANT CHANGE UP
COLOR SPEC: ABNORMAL
CREAT SERPL-MCNC: 0.74 MG/DL — SIGNIFICANT CHANGE UP (ref 0.5–1.3)
CREATININE URINE RESULT, DAU: 16 MG/DL — SIGNIFICANT CHANGE UP
DIFF PNL FLD: ABNORMAL
EGFR: 114 ML/MIN/1.73M2 — SIGNIFICANT CHANGE UP
EOSINOPHIL # BLD AUTO: 0.2 K/UL — SIGNIFICANT CHANGE UP (ref 0–0.5)
EOSINOPHIL NFR BLD AUTO: 2.9 % — SIGNIFICANT CHANGE UP (ref 0–6)
EPI CELLS # UR: ABNORMAL
ETHANOL SERPL-MCNC: <10 MG/DL — SIGNIFICANT CHANGE UP
FLUAV AG NPH QL: SIGNIFICANT CHANGE UP
FLUBV AG NPH QL: SIGNIFICANT CHANGE UP
GLUCOSE SERPL-MCNC: 86 MG/DL — SIGNIFICANT CHANGE UP (ref 70–99)
GLUCOSE UR QL: NEGATIVE — SIGNIFICANT CHANGE UP
HCG SERPL-ACNC: <5 MIU/ML — SIGNIFICANT CHANGE UP
HCT VFR BLD CALC: 39.5 % — SIGNIFICANT CHANGE UP (ref 34.5–45)
HGB BLD-MCNC: 12.7 G/DL — SIGNIFICANT CHANGE UP (ref 11.5–15.5)
IANC: 2.8 K/UL — SIGNIFICANT CHANGE UP (ref 1.8–7.4)
IMM GRANULOCYTES NFR BLD AUTO: 0.3 % — SIGNIFICANT CHANGE UP (ref 0–0.9)
KETONES UR-MCNC: NEGATIVE — SIGNIFICANT CHANGE UP
LEUKOCYTE ESTERASE UR-ACNC: ABNORMAL
LYMPHOCYTES # BLD AUTO: 3.26 K/UL — SIGNIFICANT CHANGE UP (ref 1–3.3)
LYMPHOCYTES # BLD AUTO: 47.4 % — HIGH (ref 13–44)
MCHC RBC-ENTMCNC: 31.1 PG — SIGNIFICANT CHANGE UP (ref 27–34)
MCHC RBC-ENTMCNC: 32.2 GM/DL — SIGNIFICANT CHANGE UP (ref 32–36)
MCV RBC AUTO: 96.6 FL — SIGNIFICANT CHANGE UP (ref 80–100)
METHADONE UR-MCNC: POSITIVE
MONOCYTES # BLD AUTO: 0.56 K/UL — SIGNIFICANT CHANGE UP (ref 0–0.9)
MONOCYTES NFR BLD AUTO: 8.1 % — SIGNIFICANT CHANGE UP (ref 2–14)
NEUTROPHILS # BLD AUTO: 2.8 K/UL — SIGNIFICANT CHANGE UP (ref 1.8–7.4)
NEUTROPHILS NFR BLD AUTO: 40.7 % — LOW (ref 43–77)
NITRITE UR-MCNC: NEGATIVE — SIGNIFICANT CHANGE UP
NRBC # BLD: 0 /100 WBCS — SIGNIFICANT CHANGE UP (ref 0–0)
NRBC # FLD: 0 K/UL — SIGNIFICANT CHANGE UP (ref 0–0)
OPIATES UR-MCNC: NEGATIVE — SIGNIFICANT CHANGE UP
OXYCODONE UR-MCNC: NEGATIVE — SIGNIFICANT CHANGE UP
PCP SPEC-MCNC: SIGNIFICANT CHANGE UP
PCP UR-MCNC: NEGATIVE — SIGNIFICANT CHANGE UP
PH UR: 6.5 — SIGNIFICANT CHANGE UP (ref 5–8)
PLATELET # BLD AUTO: 170 K/UL — SIGNIFICANT CHANGE UP (ref 150–400)
POTASSIUM SERPL-MCNC: 4.2 MMOL/L — SIGNIFICANT CHANGE UP (ref 3.5–5.3)
POTASSIUM SERPL-SCNC: 4.2 MMOL/L — SIGNIFICANT CHANGE UP (ref 3.5–5.3)
PROT SERPL-MCNC: 6.2 G/DL — SIGNIFICANT CHANGE UP (ref 6–8.3)
PROT UR-MCNC: ABNORMAL
RBC # BLD: 4.09 M/UL — SIGNIFICANT CHANGE UP (ref 3.8–5.2)
RBC # FLD: 13.1 % — SIGNIFICANT CHANGE UP (ref 10.3–14.5)
RBC CASTS # UR COMP ASSIST: SIGNIFICANT CHANGE UP /HPF (ref 0–4)
RSV RNA NPH QL NAA+NON-PROBE: SIGNIFICANT CHANGE UP
SALICYLATES SERPL-MCNC: <0.3 MG/DL — LOW (ref 15–30)
SARS-COV-2 RNA SPEC QL NAA+PROBE: SIGNIFICANT CHANGE UP
SODIUM SERPL-SCNC: 142 MMOL/L — SIGNIFICANT CHANGE UP (ref 135–145)
SP GR SPEC: 1 — LOW (ref 1.01–1.05)
THC UR QL: NEGATIVE — SIGNIFICANT CHANGE UP
TOXICOLOGY SCREEN, DRUGS OF ABUSE, SERUM RESULT: SIGNIFICANT CHANGE UP
TSH SERPL-MCNC: 0.9 UIU/ML — SIGNIFICANT CHANGE UP (ref 0.27–4.2)
UROBILINOGEN FLD QL: SIGNIFICANT CHANGE UP
WBC # BLD: 6.88 K/UL — SIGNIFICANT CHANGE UP (ref 3.8–10.5)
WBC # FLD AUTO: 6.88 K/UL — SIGNIFICANT CHANGE UP (ref 3.8–10.5)
WBC UR QL: SIGNIFICANT CHANGE UP /HPF (ref 0–5)

## 2022-11-23 PROCEDURE — 99285 EMERGENCY DEPT VISIT HI MDM: CPT

## 2022-11-23 PROCEDURE — 99284 EMERGENCY DEPT VISIT MOD MDM: CPT

## 2022-11-23 PROCEDURE — 93010 ELECTROCARDIOGRAM REPORT: CPT

## 2022-11-23 RX ORDER — HALOPERIDOL DECANOATE 100 MG/ML
1 INJECTION INTRAMUSCULAR EVERY 8 HOURS
Refills: 0 | Status: DISCONTINUED | OUTPATIENT
Start: 2022-11-24 | End: 2022-12-05

## 2022-11-23 RX ORDER — LANOLIN ALCOHOL/MO/W.PET/CERES
10 CREAM (GRAM) TOPICAL AT BEDTIME
Refills: 0 | Status: DISCONTINUED | OUTPATIENT
Start: 2022-11-24 | End: 2022-12-05

## 2022-11-23 RX ORDER — HALOPERIDOL DECANOATE 100 MG/ML
2 INJECTION INTRAMUSCULAR EVERY 12 HOURS
Refills: 0 | Status: DISCONTINUED | OUTPATIENT
Start: 2022-11-24 | End: 2022-12-05

## 2022-11-23 RX ORDER — DIPHENHYDRAMINE HCL 50 MG
25 CAPSULE ORAL EVERY 4 HOURS
Refills: 0 | Status: DISCONTINUED | OUTPATIENT
Start: 2022-11-24 | End: 2022-12-05

## 2022-11-23 NOTE — ED ADULT TRIAGE NOTE - CHIEF COMPLAINT QUOTE
pt having suicidal thoughts today worsening over the day, pt crying, worried she might harm herself if she stays home alone. pt history of drug abuse, daily methadone x 5 years. PMH: depression, suicide attempt, drug abuse

## 2022-11-23 NOTE — ED ADULT NURSE NOTE - OBJECTIVE STATEMENT
Pt came in via walkin for c/o worsening depression with SI with plan. Reports she relapse with drugs, currently on methadone, losing her apartment. Denies HI,VH,AH,etoh,

## 2022-11-23 NOTE — ED BEHAVIORAL HEALTH NOTE - BEHAVIORAL HEALTH NOTE
Writer contacted [employer] ([phone]) and spoke w/  Valeriy TONEY  who reported the patient’s  Plan is inactive terminated dec 31 2019 . Writer to sign out authorization to overnight AR[facility].

## 2022-11-23 NOTE — ED PROVIDER NOTE - OBJECTIVE STATEMENT
This is a 27 F, pmh heroin abuse, mdd, adhd, anxiety with c/o depression This is a 27 F, pmh heroin abuse, mdd, adhd, anxiety with c/o depression and si with a plan. Upon arrival emotional, crying.  Identify triggers as possible she will loose her apartment and does not have a place to go. Her mother passed away 5 years ago due to overdose, and with the upcoming Holidays it's hard on her. Feels impulse and unsafe to go nikki, her plan  [place] bridge [place] train station Reports recently slipped and used heroin about a month ago and about 2 weeks ago used cocaine and crack. Currently on methadone daily.

## 2022-11-23 NOTE — ED BEHAVIORAL HEALTH ASSESSMENT NOTE - [id number]_PSY_A_CORE
Electromyography & Nerve Conduction Report:    History:  Patient has symptoms of left upper limb weakness, paresthesias and pain.    Electrophysiological conclusions:  There is no electrophysiological evidence of acute/active C5-T1 radiculopathy, brachial plexopathy, myopathy, entrapment neuropathy, or peripheral polyneuropathy at left upper limb.      Many thanks for allowing us to participate in the care of your patient via electrophysiologic studies.    Marko Hayes MD  Dept. of PM & R  Board Certified: American Board of PM & R.  Board Certified: American Board of Electrodiagnostic Medicine.  Board Certified: American Board of Neuromuscular diseases.  Board Certified: American Board of Brain Injury Medicine.  Board Certified: Neuromuscular Ultrasound.  Electronically authenticated:  Date: 1/12/2022   Time: 2:23 PM             Detailed Report:  Nerve conduction studies were performed at left upper limb:    Compound motor action potential CMAP:  Distal motor latencies, amplitudes, and conduction velocities within normal limits at left median and ulnar nerves.    Sensory Nerve Action Potential SNAP:  Peak sensory latencies, amplitudes and conduction velocities within normal limits at left median, radial and ulnar nerves.  Combined Sensory Nerve Index (CSI) < 1 ms.  Note: Study performed at anatomical snuff box & Digits 1 and 4.    Needle EMG:  Needle EMG performed at left upper limb using a disposable monopolar needle electrode - please see table for the muscles studied:  Insertional activity within normal limits, no abnormal spontaneous activity.  Volitional MUAP's (motor unit action potentials) within normal limits, with normal amplitude, duration, and configuration.  Motor unit recruitment showed good development of a full interference pattern.      Justification of the number of nerve conduction studies:  Based on the patient's symptoms of wrist pain, prior Carpal tunnel release surgery, with numbness, tingling  in the hand digits, it is medically necessary, and reasonable to perform the above studies, to compare and review the studied nerves, and hence to evaluate and rule out combined disease processes, and differential diagnoses such as radiculopathy, brachial plexopathy, entrapment neuropathies, or peripheral polyneuropathy at the upper limb.       Cooperative
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
## 2022-11-23 NOTE — ED BEHAVIORAL HEALTH ASSESSMENT NOTE - DESCRIPTION
Vital Signs Last 24 Hrs  T(C): 36.6 (23 Nov 2022 18:56), Max: 36.6 (23 Nov 2022 18:56)  T(F): 97.8 (23 Nov 2022 18:56), Max: 97.8 (23 Nov 2022 18:56)  HR: 75 (23 Nov 2022 18:56) (75 - 75)  BP: 130/88 (23 Nov 2022 18:56) (130/88 - 130/88)  BP(mean): --  RR: 14 (23 Nov 2022 18:56) (14 - 14)  SpO2: 100% (23 Nov 2022 18:56) (100% - 100%)    Parameters below as of 23 Nov 2022 18:56  Patient On (Oxygen Delivery Method): room air single , unemployed, no GED. Not in relationship See HPI Hep C

## 2022-11-23 NOTE — ED PROVIDER NOTE - NSICDXPASTMEDICALHX_GEN_ALL_CORE_FT
PAST MEDICAL HISTORY:  Anxiety     Heroin abuse     MDD (major depressive disorder)     Substance abuse

## 2022-11-23 NOTE — ED BEHAVIORAL HEALTH ASSESSMENT NOTE - HPI (INCLUDE ILLNESS QUALITY, SEVERITY, DURATION, TIMING, CONTEXT, MODIFYING FACTORS, ASSOCIATED SIGNS AND SYMPTOMS)
The patient is 27 years old single, unemployed female, non-caregiver, who resides wit her "older friend" ; PMH of Hep C, PPH of depression, anxiety, no psych admissions, but one SA buy OD on drugs "8 years ago , I did not tell anyone" as per the pt,  she has history of opioid/cocaine dependence, currently in treatment in Methadone maintenance program at [facility], under care of Dr Schafer. She denies any Hx of violence, no legal issues. Patient was BIB her friend for worsening of depression and SI.    During the evaluation patient is tearful and anxious. She reports that she has been feeling depressed for "a while" but last several days she is not able to function. She states that she feels helpless and hopeless, does not want to do anything, she "even" stopped taking showers. She states that sometimes she sleeps well and sometimes she can't sleep. She feels anxious, depressed, cries a lot , can't concentrate and does not want to live anymore.  No plan. She feels hopeless, she states that her friend tried to convince her that "I still can fix it;  but I don't think it's possible, it is too late, I have seen enough cruelty, I am tired; I don't want to be here anymore". Patient states that she has poor support; she does not get along well with her maternal aunt and brother; she has financially supportive "older friend, but he is nasty; he pays for everything; but he is nasty" . She feels like she is "nothing, because I don't work, and I could not even get my GED". Patient also admits to excessive guilt, she believes that she was the reason that her mother started using drugs and OD herself and , although she knows that her mother's BF was a drug dealer and she had easy access to drugs.   Patient denies any psychotic symptoms, she denies hearing voices, no visions or delusions, No IOR, paranoia, thought insertion or thought broadcasting. She denies any manic symptoms, no diminished need for sleep or goal directed activity, no spending spree.   She reports that she relapsed 2 weeks ago on cocaine and crack, feels guilty about it. Denies recent use

## 2022-11-23 NOTE — ED BEHAVIORAL HEALTH ASSESSMENT NOTE - NSHISTORFACTOR_PSY_ALL_CORE
fights in the past (in 10th grade ). Mother OD herself " I was rude to her, I don't know, maybe she killed herself"/Family History of Suicidal behavior/History of Impulsivity

## 2022-11-23 NOTE — ED BEHAVIORAL HEALTH ASSESSMENT NOTE - PSYCHIATRIC ISSUES AND PLAN (INCLUDE STANDING AND PRN MEDICATION)
depression anxiety, Patient tried zoloft , did not like it., treatment team will decide with the PT, prns  PATIENT IS IN METHADONE

## 2022-11-23 NOTE — ED BEHAVIORAL HEALTH ASSESSMENT NOTE - SUMMARY
The patient is 27 years old single, unemployed female, non-caregiver, who resides wit her "older friend" ; PMH of Hep C, PPH of depression, anxiety, no psych admissions, but one SA buy OD on drugs "8 years ago , I did not tell anyone" as per the pt,  she has history of opioid/cocaine dependence, currently in treatment in Methadone maintenance program at [facility], under care of Dr Schafer. She denies any Hx of violence, no legal issues. Patient was BIB her friend for worsening of depression and SI.    During the evaluation patient is tearful and anxious. She reports that she has been feeling depressed for "a while" but last several days she is not able to function. She states that she feels helpless and hopeless, does not want to do anything, she "even" stopped taking showers. She states that sometimes she sleeps well and sometimes she can't sleep. She feels anxious, depressed, cries a lot , can't concentrate and does not want to live anymore.  No plan. She feels hopeless, she states that her friend tried to convince her that "I still can fix it;  but I don't think it's possible, it is too late, I have seen enough cruelty, I am tired; I don't want to be here anymore". Patient states that she has poor support; she does not get along well with her maternal aunt and brother; she has financially supportive "older friend, but he is nasty; he pays for everything; but he is nasty" . She feels like she is "nothing, because I don't work, and I could not even get my GED". Patient also admits to excessive guilt, she believes that she was the reason that her mother started using drugs and OD herself and , although she knows that her mother's BF was a drug dealer and she had easy access to drugs.   Patient denies any psychotic symptoms, she denies hearing voices, no visions or delusions, No IOR, paranoia, thought insertion or thought broadcasting. She denies any manic symptoms, no diminished need for sleep or goal directed activity, no spending spree.   She reports that she relapsed 2 weeks ago on cocaine and crack, feels guilty about it. Denies recent use     patient has good insight; wants to get help, will be admitted on voluntary bases.

## 2022-11-23 NOTE — ED BEHAVIORAL HEALTH NOTE - BEHAVIORAL HEALTH NOTE
COVID Exposure Screen- Patient  1.	*Have you had a COVID-19 test in the last 90 days?  (  ) Yes   ( X ) No   (  ) Unknown- Reason: _____  IF YES PROCEED TO QUESTION #2. IF NO OR UNKNOWN, PLEASE SKIP TO QUESTION #3.  2.	Date of test(s) and result(s): ________  3.	*Have you tested positive for COVID-19 antibodies? (  ) Yes   (  ) No   (  ) Unknown- Reason: _____  IF YES PROCEED TO QUESTION #4. IF NO or UNKNOWN, PLEASE SKIP TO QUESTION #5.  4.	Date of positive antibody test: ________  5.	*Have you received 2 doses of the COVID-19 vaccine? (  ) Yes   ( X  ) No   (  ) Unknown- Reason: _____   IF YES PROCEED TO QUESTION #6. IF NO or UNKNOWN, PLEASE SKIP TO QUESTION #7.  6.	Date of second dose: ________  7.	*In the past 10 days, have you been around anyone with a positive COVID-19 test?* (  ) Yes   ( X ) No   (  ) Unknown- Reason: ____  IF YES PROCEED TO QUESTION #8. IF NO or UNKNOWN, PLEASE SKIP TO QUESTION #13.  8.	Were you within 6 feet of them for at least 15 minutes? (  ) Yes   (  ) No   (  ) Unknown- Reason: _____  9.	Have you provided care for them? (  ) Yes   (  ) No   (  ) Unknown- Reason: ______  10.	Have you had direct physical contact with them (touched, hugged, or kissed them)? (  ) Yes   (  ) No    (  ) Unknown- Reason: _____  11.	Have you shared eating or drinking utensils with them? (  ) Yes   (  ) No    (  ) Unknown- Reason: ____  12.	Have they sneezed, coughed, or somehow gotten respiratory droplets on you? (  ) Yes   (  ) No    (  ) Unknown- Reason: ______  13.	*Have you been out of [place] within the past 10 days?* (  ) Yes   ( X ) No   (  ) Unknown- Reason: _____  IF YES PLEASE ANSWER THE FOLLOWING QUESTIONS:  14.	Which state/country have you been to? ______  15.	Were you there over 24 hours? (  ) Yes   (  ) No    (  ) Unknown- Reason: ______  16.	Date of return to [facility]: ______

## 2022-11-23 NOTE — ED PROVIDER NOTE - CLINICAL SUMMARY MEDICAL DECISION MAKING FREE TEXT BOX
This is a 27 F, pmh heroin abuse, mdd, adhd, anxiety with c/o depression and si with a plan. Upon arrival emotional, crying.  Identify triggers as possible she will loose her apartment and does not have a place to go. Her mother passed away 5 years ago due to overdose, and with the upcoming Holidays it's hard on her. Feels impulse and unsafe to go [place], her plan  [place] Reports recently slipped and used heroin about a month ago and about 2 weeks ago used cocaine and crack. Currently on methadone daily.   labs, psych consult

## 2022-11-23 NOTE — ED BEHAVIORAL HEALTH ASSESSMENT NOTE - RISK ASSESSMENT
patient is severely depressed, unable to function, she stopped taking showers; feels hopeless and suicidal Patient has poor support; HX of SA and anxiety, She stopped her meds except methadone. She needs psych admission for safety Pt signed voluntary papers

## 2022-11-23 NOTE — ED BEHAVIORAL HEALTH ASSESSMENT NOTE - VIOLENCE RISK FACTORS:
Feeling of being under threat and being unable to control threat/Substance abuse/Affective dysregulation/Impulsivity

## 2022-11-23 NOTE — ED BEHAVIORAL HEALTH NOTE - BEHAVIORAL HEALTH NOTE
As per request of provider, writer contacted patient’s friend Matthew to obtain collateral information. The following information is per Matthew.    Patient is a 26 Yo female domiciled alone, single, no children,  hx of substance abuse, BIB friend matthew due to patient endorsing Si. Matthew report patient has been sober several years and is on methadone maintenance. Matthew reports patient called him today stating she didn’t want to live anymore. No plan or intention was endorsed. Matthew reports patient has a hx of suicide attempts from “ a very long time ago”. Matthew reports the patient said she didn’t want to live and that she could see herself doing something but did not elaborate. Matthew suspects stressors include holidays, court and her mother passing several years ago. Matthew reports they are in contact often. He says patient’s sleep has been poor, appetite has been horrible and hygiene has been okay. He reports patient is not working or studying. Matthew reports patient has not endorsed any AVH, paranoia or delusions. Matthew believes patient has no prior psych hospitalizations and has a hx of rehabs. He reports patient is in treatment w/ Dr. Boland in [place] ([phone]). Matthew reports patient is on methadone, klonopin and something else but does not know specifics. He believe patient is complaint w/ medication. NO medical problems reported. Patient has severe scarring on her arms from IV drug use in the past which started out as an infection and was never treated. Patient is not covid vaccinated and has no recent travel or exposure. Matthew feels patient would benefit from a short admission only if it is voluntary. Matthew thinks if patient were to be offered admission she would agreed. Writer agreed to keep Matthew updated. As per request of provider, writer contacted patient’s friend Matthew ([phone]) to obtain collateral information. The following information is per Matthew.    Patient is a 28 Yo female domiciled alone, single, no children,  hx of substance abuse, BIB friend matthew due to patient endorsing Si. Matthew report patient has been sober several years and is on methadone maintenance. Matthew reports patient called him today stating she didn’t want to live anymore. No plan or intention was endorsed. Matthew reports patient has a hx of suicide attempts from “ a very long time ago”. Matthew reports the patient said she didn’t want to live and that she could see herself doing something but did not elaborate. Matthew suspects stressors include holidays, court and her mother passing several years ago. Matthew reports they are in contact often. He says patient’s sleep has been poor, appetite has been horrible and hygiene has been okay. He reports patient is not working or studying. Matthew reports patient has not endorsed any AVH, paranoia or delusions. Matthew believes patient has no prior psych hospitalizations and has a hx of rehabs. He reports patient is in treatment w/ Dr. Boland in [place] ([phone]). Matthew reports patient is on methadone, klonopin and something else but does not know specifics. He believe patient is complaint w/ medication. NO medical problems reported. Patient has severe scarring on her arms from IV drug use in the past which started out as an infection and was never treated. Patient is not covid vaccinated and has no recent travel or exposure. Matthew feels patient would benefit from a short admission only if it is voluntary. Matthew thinks if patient were to be offered admission she would agreed. Writer agreed to keep Matthew updated. As per request of provider, writer contacted patient’s friend Matthew ([phone]) to obtain collateral information. The following information is per Matthew.    Patient is a 26 Yo female domiciled alone, single, no children,  hx of substance abuse, BIB friend matthew due to patient endorsing Si. Matthew report patient has been sober several years and is on methadone maintenance. Matthew reports patient called him today stating she didn’t want to live anymore. No plan or intention was endorsed. Matthew reports patient has a hx of suicide attempts from “ a very long time ago”. Matthew reports the patient said she didn’t want to live and that she could see herself doing something but did not elaborate. Matthew suspects stressors include holidays, court and her mother passing several years ago. Matthew reports they are in contact often. He says patient’s sleep has been poor, appetite has been horrible and hygiene has been okay. He reports patient is not working or studying. Matthew reports patient has not endorsed any AVH, paranoia or delusions. Matthew believes patient has no prior psych hospitalizations and has a hx of rehabs. He reports patient is in treatment w/ Dr. Boland in [place] ([phone]). Matthew reports patient is on methadone, klonopin and something else but does not know specifics. He believe patient is complaint w/ medication. NO medical problems reported. Patient has severe scarring on her arms from IV drug use in the past which started out as an infection and was never treated. Patient is not covid vaccinated and has no recent travel or exposure. Matthew feels patient would benefit from a short admission only if it is voluntary. Matthew thinks if patient were to be offered admission she would agreed. Writer agreed to keep Matthew updated.    Writer informed matthew of patient's admission to [facility].

## 2022-11-24 LAB
COVID-19 SPIKE DOMAIN AB INTERP: POSITIVE
COVID-19 SPIKE DOMAIN ANTIBODY RESULT: 149 U/ML — HIGH
SARS-COV-2 IGG+IGM SERPL QL IA: 149 U/ML — HIGH
SARS-COV-2 IGG+IGM SERPL QL IA: POSITIVE

## 2022-11-24 PROCEDURE — 99221 1ST HOSP IP/OBS SF/LOW 40: CPT

## 2022-11-24 RX ORDER — METHADONE HYDROCHLORIDE 40 MG/1
80 TABLET ORAL DAILY
Refills: 0 | Status: DISCONTINUED | OUTPATIENT
Start: 2022-11-24 | End: 2022-11-29

## 2022-11-24 RX ORDER — METHADONE HYDROCHLORIDE 40 MG/1
20 TABLET ORAL DAILY
Refills: 0 | Status: DISCONTINUED | OUTPATIENT
Start: 2022-11-24 | End: 2022-11-29

## 2022-11-24 RX ORDER — METHADONE HYDROCHLORIDE 40 MG/1
100 TABLET ORAL ONCE
Refills: 0 | Status: DISCONTINUED | OUTPATIENT
Start: 2022-11-24 | End: 2022-11-24

## 2022-11-24 RX ORDER — NICOTINE POLACRILEX 2 MG
1 GUM BUCCAL DAILY
Refills: 0 | Status: DISCONTINUED | OUTPATIENT
Start: 2022-11-24 | End: 2022-12-05

## 2022-11-24 RX ADMIN — METHADONE HYDROCHLORIDE 20 MILLIGRAM(S): 40 TABLET ORAL at 10:15

## 2022-11-24 RX ADMIN — Medication 1 PATCH: at 17:09

## 2022-11-24 RX ADMIN — METHADONE HYDROCHLORIDE 80 MILLIGRAM(S): 40 TABLET ORAL at 10:15

## 2022-11-24 RX ADMIN — Medication 10 MILLIGRAM(S): at 21:13

## 2022-11-24 NOTE — BH INPATIENT PSYCHIATRY ASSESSMENT NOTE - MSE UNSTRUCTURED FT
Appearance: Dressed appropriately.  Behavior: Cooperative.    Motor: Psychomotor slowing.  Speech: Regular rate.  Mood: "Depressed."  Affect: Depressed, tearful.  Thought Process: Linear.  Associations: Fair.  Thought Content: Intermittent SI, but no current intent or plan.   Insight: Fair.   Judgment: Fair on interview.  Attention: Fair.  Language: Fluent.  Gait: Intact.

## 2022-11-24 NOTE — BH INPATIENT PSYCHIATRY ASSESSMENT NOTE - ADDITIONAL DETAILS ALL
pt OD herself 8 years ago Currently states she still gets suicidal thoughts intermittently but no intent or plan in hospital.

## 2022-11-24 NOTE — BH INPATIENT PSYCHIATRY ASSESSMENT NOTE - NSACTIVEVENT_PSY_ALL_CORE
Triggering events leading to humiliation, shame, and/or despair (e.g., Loss of relationship, financial or health status) (real or anticipated)/Legal problems/Substance intoxication or withdrawal

## 2022-11-24 NOTE — BH PATIENT PROFILE - FUNCTIONAL ASSESSMENT - BASIC MOBILITY 1.
Sino-Nasal Outcome Questionaire    Consider how severe the problem is when experienced and how frequently it has happened over the past two weeks.    Please rate each item using this ratin = No problem  1 = Very mild problem  2 = Mild or slight problem  3 = Moderate problem  4 = Severe problem  5 = Problem as bad as it can be    Need to blow nose 3 - Moderate problem   Sneezing 2 - Mild or slight problem   Runny nose 2 - Mild or slight problem   Cough 1 - Very mild problem   Post nasal discharge 0 - No problem   Thick nasal discharge 3 - Moderate problem   Ear fullness 3 - Moderate problem   Dizziness 0 - No problem   Ear pain/pressure 0 - No problem   Facial pain/pressure 1 - Very mild problem   Difficulty falling asleep 0 - No problem   Waking up at night 0 - No problem   Lack of a good night's sleep 3 - Moderate problem   Waking up tired 4 - Severe problem   Fatigue during the day 4 - Severe problem   Reduced productivity 4 - Severe problem   Reduced concentration 4 - Severe problem   Frustrated/restless/irritable 4 - Severe problem   Sad 3 - Moderate problem   Embarrassed 0 - No problem   Sense of taste/smell 4 - Severe problem   Blockage/congestion of nose 4 - Severe problem   TOTAL 49          4 = No assist / stand by assistance

## 2022-11-24 NOTE — BH INPATIENT PSYCHIATRY ASSESSMENT NOTE - SELF INJURIOUS BEHAVIOR WITHOUT SUICIDAL INTENT:
[Normal] : soft, non-tender, non-distended, no masses palpated, no HSM and normal bowel sounds [Normal Affect] : the affect was normal [de-identified] : diffuse erythema of abdomen, more on right side, heat, area of central irritation [Normal Insight/Judgement] : insight and judgment were intact None known

## 2022-11-24 NOTE — BH INPATIENT PSYCHIATRY ASSESSMENT NOTE - NSBHMETABOLIC_PSY_ALL_CORE_FT
BMI: BMI (kg/m2): 19.2 (11-24-22 @ 03:10)  HbA1c:   Glucose:   BP: 111/73 (11-24-22 @ 01:34) (102/59 - 130/88)  Lipid Panel:

## 2022-11-24 NOTE — BH PATIENT PROFILE - HOME MEDICATIONS
doxycycline monohydrate 100 mg oral tablet , 1 tab(s) orally 2 times a day x 7 days   methadone 40 mg oral tablet  Suboxone ,  sublingual

## 2022-11-24 NOTE — BH INPATIENT PSYCHIATRY ASSESSMENT NOTE - NSBHCHARTREVIEWVS_PSY_A_CORE FT
Vital Signs Last 24 Hrs  T(C): 36.7 (11-24-22 @ 07:57), Max: 36.9 (11-23-22 @ 22:25)  T(F): 98 (11-24-22 @ 07:57), Max: 98.4 (11-23-22 @ 22:25)  HR: 55 (11-24-22 @ 01:34) (51 - 75)  BP: 111/73 (11-24-22 @ 01:34) (102/59 - 130/88)  BP(mean): --  RR: 17 (11-24-22 @ 07:57) (14 - 17)  SpO2: 100% (11-24-22 @ 03:10) (98% - 100%)    Orthostatic VS  11-24-22 @ 07:57  Lying BP: --/-- HR: --  Sitting BP: 104/64 HR: 69  Standing BP: 100/60 HR: 72  Site: --  Mode: --  Orthostatic VS  11-24-22 @ 03:10  Lying BP: --/-- HR: --  Sitting BP: 111/73 HR: 55  Standing BP: 112/70 HR: 58  Site: --  Mode: --

## 2022-11-24 NOTE — BH PATIENT PROFILE - FALL HARM RISK - UNIVERSAL INTERVENTIONS
The sheath was removed from the left femoral artery.  Bed in lowest position, wheels locked, appropriate side rails in place/Call bell, personal items and telephone in reach/Instruct patient to call for assistance before getting out of bed or chair/Non-slip footwear when patient is out of bed/[place] to call system/Physically safe environment - no spills, clutter or unnecessary equipment/Purposeful Proactive Rounding/Room/bathroom lighting operational, light cord in reach

## 2022-11-24 NOTE — BH INPATIENT PSYCHIATRY ASSESSMENT NOTE - HPI (INCLUDE ILLNESS QUALITY, SEVERITY, DURATION, TIMING, CONTEXT, MODIFYING FACTORS, ASSOCIATED SIGNS AND SYMPTOMS)
As per ER assessment:  "The patient is 27 years old single, unemployed female, non-caregiver, who resides wit her "older friend" ; PMH of Hep C, PPH of depression, anxiety, no psych admissions, but one SA buy OD on drugs "8 years ago , I did not tell anyone" as per the pt,  she has history of opioid/cocaine dependence, currently in treatment in Methadone maintenance program at [facility], under care of Dr Schafer. She denies any Hx of violence, no legal issues. Patient was BIB her friend for worsening of depression and SI. During the evaluation patient is tearful and anxious. She reports that she has been feeling depressed for "a while" but last several days she is not able to function. She states that she feels helpless and hopeless, does not want to do anything, she "even" stopped taking showers. She states that sometimes she sleeps well and sometimes she can't sleep. She feels anxious, depressed, cries a lot , can't concentrate and does not want to live anymore.  No plan. She feels hopeless, she states that her friend tried to convince her that "I still can fix it;  but I don't think it's possible, it is too late, I have seen enough cruelty, I am tired; I don't want to be here anymore". Patient states that she has poor support; she does not get along well with her maternal aunt and brother; she has financially supportive "older friend, but he is nasty; he pays for everything; but he is nasty" . She feels like she is "nothing, because I don't work, and I could not even get my GED". Patient also admits to excessive guilt, she believes that she was the reason that her mother started using drugs and OD herself and , although she knows that her mother's BF was a drug dealer and she had easy access to drugs. Patient denies any psychotic symptoms, she denies hearing voices, no visions or delusions, No IOR, paranoia, thought insertion or thought broadcasting. She denies any manic symptoms, no diminished need for sleep or goal directed activity, no spending spree. She reports that she relapsed 2 weeks ago on cocaine and crack, feels guilty about it."    On interview, pt states she has not felt well in over a year, has been struggling with depression and anxiety in context of psychosocial stressors - not having education or employment, stressful family issues, and now possibly facing eviction.  Pt states she has been very depressed and hopeless.  States she has suicidal thoughts, recently had plan to jump off train platform [place] to her home.  States she still thinks about suicide but would not do anything in hospital.  Pt acknowledges having recent relapse on cocaine 2 weeks ago, but denies any other recreational drug use other than that one instance.  Pt states she takes methadone and clonazepam as an outpatient.  She follows with [facility].  Pt states she has tried antidepressants in the past and does not wish to try them again.

## 2022-11-24 NOTE — BH INPATIENT PSYCHIATRY ASSESSMENT NOTE - CURRENT MEDICATION
MEDICATIONS  (STANDING):  melatonin 10 milliGRAM(s) Oral at bedtime  methadone    Tablet 20 milliGRAM(s) Oral daily  methadone   Dispersible Tablet 80 milliGRAM(s) Oral daily    MEDICATIONS  (PRN):  diphenhydrAMINE 25 milliGRAM(s) Oral every 4 hours PRN Rash and/or Itching  haloperidol     Tablet 1 milliGRAM(s) Oral every 8 hours PRN severe agitation  haloperidol    Injectable 2 milliGRAM(s) IntraMuscular every 12 hours PRN Agitation  LORazepam     Tablet 1 milliGRAM(s) Oral every 12 hours PRN Anxiety

## 2022-11-24 NOTE — BH INPATIENT PSYCHIATRY ASSESSMENT NOTE - NSBHASSESSSUMMFT_PSY_ALL_CORE
27F w/depression, anxiety, substance use disorder, admitted with worsening chronic depression and suicidality.    1. Pt declines any additional medication management.  Continue home meds for now.  Writer attempted to contact [facility] to confirm methadone dose, however no response, defer to primary team.  2. Defer collateral to primary team.

## 2022-11-24 NOTE — BH PATIENT PROFILE - FUNCTIONAL ASSESSMENT - BASIC MOBILITY PT AGE POP HIDDEN
Quality 111:Pneumonia Vaccination Status For Older Adults: Pneumococcal vaccine (PPSV23) administered on or after patient’s 60th birthday and before the end of the measurement period Detail Level: Detailed Quality 130: Documentation Of Current Medications In The Medical Record: Current Medications Documented Adult

## 2022-11-24 NOTE — BH PATIENT PROFILE - COPING STRESSORS, PROFILE
chemical dependency/abuse/death of a loved one/family problems/legal concerns/mental health condition

## 2022-11-24 NOTE — BH INPATIENT PSYCHIATRY ASSESSMENT NOTE - DETAILS
Patient reports that about 8 years ago she tried to OD herself (it was a SA, she OD herself on heroin, states that  it was an impulsive decision, but she regrets that she woke up next day. See above.

## 2022-11-25 PROCEDURE — 99233 SBSQ HOSP IP/OBS HIGH 50: CPT

## 2022-11-25 RX ORDER — LANOLIN ALCOHOL/MO/W.PET/CERES
3 CREAM (GRAM) TOPICAL AT BEDTIME
Refills: 0 | Status: DISCONTINUED | OUTPATIENT
Start: 2022-11-25 | End: 2022-12-05

## 2022-11-25 RX ORDER — POLYETHYLENE GLYCOL 3350 17 G/17G
17 POWDER, FOR SOLUTION ORAL DAILY
Refills: 0 | Status: DISCONTINUED | OUTPATIENT
Start: 2022-11-25 | End: 2022-12-05

## 2022-11-25 RX ORDER — ACETAMINOPHEN 500 MG
650 TABLET ORAL EVERY 6 HOURS
Refills: 0 | Status: DISCONTINUED | OUTPATIENT
Start: 2022-11-25 | End: 2022-12-05

## 2022-11-25 RX ORDER — ESCITALOPRAM OXALATE 10 MG/1
5 TABLET, FILM COATED ORAL ONCE
Refills: 0 | Status: DISCONTINUED | OUTPATIENT
Start: 2022-11-25 | End: 2022-12-05

## 2022-11-25 RX ORDER — TRAZODONE HCL 50 MG
25 TABLET ORAL AT BEDTIME
Refills: 0 | Status: DISCONTINUED | OUTPATIENT
Start: 2022-11-25 | End: 2022-11-28

## 2022-11-25 RX ORDER — HYDROXYZINE HCL 10 MG
25 TABLET ORAL EVERY 6 HOURS
Refills: 0 | Status: DISCONTINUED | OUTPATIENT
Start: 2022-11-25 | End: 2022-12-05

## 2022-11-25 RX ORDER — ESCITALOPRAM OXALATE 10 MG/1
5 TABLET, FILM COATED ORAL DAILY
Refills: 0 | Status: DISCONTINUED | OUTPATIENT
Start: 2022-11-26 | End: 2022-11-28

## 2022-11-25 RX ADMIN — Medication 25 MILLIGRAM(S): at 20:46

## 2022-11-25 RX ADMIN — METHADONE HYDROCHLORIDE 20 MILLIGRAM(S): 40 TABLET ORAL at 09:55

## 2022-11-25 RX ADMIN — METHADONE HYDROCHLORIDE 80 MILLIGRAM(S): 40 TABLET ORAL at 09:55

## 2022-11-25 RX ADMIN — Medication 1 MILLIGRAM(S): at 10:46

## 2022-11-25 RX ADMIN — Medication 1 PATCH: at 09:57

## 2022-11-25 RX ADMIN — Medication 10 MILLIGRAM(S): at 20:46

## 2022-11-25 NOTE — BH INPATIENT PSYCHIATRY PROGRESS NOTE - NSBHASSESSSUMMFT_PSY_ALL_CORE
27F w/depression, anxiety, substance use disorder, admitted with worsening chronic depression and suicidality.    1. Pt declines any additional medication management.  Continue home meds for now.  Writer attempted to contact [facility] to confirm methadone dose, however no response, defer to primary team.  2. Defer collateral to primary team. The patient is 27 years old single, unemployed female, non-caregiver, who resides wit her "older friend" ; PMH of Hep C, PPH of depression, anxiety, no psych admissions, but one SA buy OD on drugs "8 years ago , I did not tell anyone" as per the pt,  she has history of opioid/cocaine dependence, currently in treatment in Methadone maintenance program at [facility], under care of Dr Schafer. She denies any Hx of violence, no legal issues. Patient was BIB her friend for worsening of depression and SI.    1. legal status 9.13  2. no need for constant observation pt denies active SI/HI  3. escitalopram 5 sarahi GRAM(s) Oral once  melatonin 10 sarahi GRAM(s) Oral at bedtime  methadone    Tablet 20 sarahi GRAM(s) Oral daily  methadone   Dispersible Tablet 80 sarahi GRAM(s) Oral daily  nicotine - 21 mG/24Hr(s) Patch 1 Patch Transdermal daily  acetaminophen     Tablet .. 650 sarahi GRAM(s) Oral every 6 hours PRN Moderate Pain (4 - 6)  aluminum hydroxide/magnesium hydroxide/simethicone Suspension 30 sarahi Liter(s) Oral every 4 hours PRN Dyspepsia  Diphenhydramine 25 sarahi GRAM(s) Oral every 4 hours PRN Rash and/or Itching  haloperidol     Tablet 1 sarahi GRAM(s) Oral every 8 hours PRN severe agitation  haloperidol    Injectable 2 sarahi GRAM(s) Intramuscular every 12 hours PRN Agitation  hydroxyzine hydrochloride 25 sarahi GRAM(s) Oral every 6 hours PRN anxiety/insomnia  Lorazepam     Tablet 1 sarahi GRAM(s) Oral every 12 hours PRN Anxiety  melatonin. 3 sarahi GRAM(s) Oral at bedtime PRN Insomnia  polyethylene glycol 3350 17 Gram(s) Oral daily PRN constipation  Trazadone 25 sarahi GRAM(s) Oral at bedtime PRN insomnia  4. labs wnl lipid profile, HBGAIC, urine c+s, ordered for 11/28  5. consult for nutrition ordered  6. ekg 472/438 ms nsr  7. individual and group psychotherapy  8. discharge patient when clinically appropriate    The patient is 27 years old single, unemployed female, non-caregiver, who resides wit her "older friend" ; PMH of Hep C, PPH of depression, anxiety, no psych admissions, but one SA buy OD on drugs "8 years ago , I did not tell anyone" as per the pt,  she has history of opioid/cocaine dependence, currently in treatment in Methadone maintenance program at [facility], under care of Dr Schafer. She denies any Hx of violence, no legal issues. Patient was BIB her friend for worsening of depression and SI.    upon interview patient is very tearful and depressed during interview.  patient hs predisposition according to family history.  nutrition consult placed  and HGBAIC, regarding other medical conditions. psychosocially patient may be triggered by life events over the past couple of months.  patient currently  denies active SI/HI.  Patient has not been functioning in the past few months from depressive symptoms.  patient requiring inpatient hospitalization.        1. legal status 9.13  2. no need for constant observation pt denies active SI/HI  3. escitalopram 5 sarahi GRAM(s) Oral once  melatonin 10 sarahi GRAM(s) Oral at bedtime  methadone    Tablet 20 sarahi GRAM(s) Oral daily  methadone   Dispersible Tablet 80 sarahi GRAM(s) Oral daily  nicotine - 21 mG/24Hr(s) Patch 1 Patch Transdermal daily  acetaminophen     Tablet .. 650 sarahi GRAM(s) Oral every 6 hours PRN Moderate Pain (4 - 6)  aluminum hydroxide/magnesium hydroxide/simethicone Suspension 30 sarahi Liter(s) Oral every 4 hours PRN Dyspepsia  Diphenhydramine 25 sarahi GRAM(s) Oral every 4 hours PRN Rash and/or Itching  haloperidol     Tablet 1 sarahi GRAM(s) Oral every 8 hours PRN severe agitation  haloperidol    Injectable 2 sarahi GRAM(s) Intramuscular every 12 hours PRN Agitation  hydroxyzine hydrochloride 25 sarahi GRAM(s) Oral every 6 hours PRN anxiety/insomnia  Lorazepam     Tablet 1 sarahi GRAM(s) Oral every 12 hours PRN Anxiety  melatonin. 3 sarahi GRAM(s) Oral at bedtime PRN Insomnia  polyethylene glycol 3350 17 Gram(s) Oral daily PRN constipation  Trazadone 25 sarahi GRAM(s) Oral at bedtime PRN insomnia  4. labs wnl lipid profile, HBGAIC, urine c+s, ordered for 11/28  5. consult for nutrition ordered  6. ekg 472/438 ms nsr  7. individual and group psychotherapy  8. discharge patient when clinically appropriate    The patient is 27 years old single, unemployed female, non-caregiver, who resides wit her "older friend" ; PMH of Hep C, PPH of depression, anxiety, no psych admissions, but one SA buy OD on drugs "8 years ago , I did not tell anyone" as per the pt,  she has history of opioid/cocaine dependence, currently in treatment in Methadone maintenance program at [facility], under care of Dr Schafer. She denies any Hx of violence, no legal issues. Patient was BIB her friend for worsening of depression and SI.    upon interview patient is very tearful and depressed during interview.  patient hs predisposition according to family history.  nutrition consult placed  and HGBAIC, regarding other medical conditions. psychosocially patient may be triggered by life events over the past couple of months.  patient currently  denies active SI/HI.  Patient has not been functioning in the past few months from depressive symptoms.  patient requiring inpatient hospitalization.        1. legal status 9.13  2. no need for constant observation pt denies active SI/HI  3. escitalopram 5 sarahi GRAM(s) Oral once, pt consented to start after discussing pros and cons.  melatonin 10 sarahi GRAM(s) Oral at bedtime  methadone    Tablet 20 sarahi GRAM(s) Oral daily  methadone   Dispersible Tablet 80 sarahi GRAM(s) Oral daily  nicotine - 21 mG/24Hr(s) Patch 1 Patch Transdermal daily  acetaminophen     Tablet .. 650 sarahi GRAM(s) Oral every 6 hours PRN Moderate Pain (4 - 6)  aluminum hydroxide/magnesium hydroxide/simethicone Suspension 30 sarahi Liter(s) Oral every 4 hours PRN Dyspepsia  Diphenhydramine 25 sarahi GRAM(s) Oral every 4 hours PRN Rash and/or Itching  haloperidol     Tablet 1 sarahi GRAM(s) Oral every 8 hours PRN severe agitation  haloperidol    Injectable 2 sarahi GRAM(s) Intramuscular every 12 hours PRN Agitation  hydroxyzine hydrochloride 25 sarahi GRAM(s) Oral every 6 hours PRN anxiety/insomnia  Lorazepam     Tablet 1 sarahi GRAM(s) Oral every 12 hours PRN Anxiety  melatonin. 3 sarahi GRAM(s) Oral at bedtime PRN Insomnia  polyethylene glycol 3350 17 Gram(s) Oral daily PRN constipation  Trazadone 25 sarahi GRAM(s) Oral at bedtime PRN insomnia  4. labs wnl lipid profile, HBGAIC, urine c+s, ordered for 11/28  5. consult for nutrition ordered  6. ekg 472/438 ms nsr  7. individual and group psychotherapy  8. discharge patient when clinically appropriate    The patient is 27 years old single, unemployed female, non-caregiver, who resides wit her "older friend" ; PMH of Hep C, PPH of depression, anxiety, no psych admissions, but one SA buy OD on drugs "8 years ago , I did not tell anyone" as per the pt,  she has history of opioid/cocaine dependence, currently in treatment in Methadone maintenance program at [facility], under care of Dr Schafer. She denies any Hx of violence, no legal issues. Patient was BIB her friend for worsening of depression and SI.    upon interview patient is very tearful and depressed during interview.  patient hs predisposition according to family history.  nutrition consult placed  and HGBAIC, regarding other medical conditions. psychosocially patient may be triggered by life events over the past couple of months.  patient currently  denies active SI/HI.  Patient has not been functioning in the past few months from depressive symptoms.  patient requiring inpatient hospitalization.        1. legal status 9.13  2. no need for constant observation pt denies active SI/HI  3. escitalopram 5 sarahi GRAM(s) Oral once, pt consented to start after discussing pros and cons.  melatonin 10 sarahi GRAM(s) Oral at bedtime  methadone    Tablet 20 sarahi GRAM(s) Oral daily  methadone   Dispersible Tablet 80 sarahi GRAM(s) Oral daily  nicotine - 21 mG/24Hr(s) Patch 1 Patch Transdermal daily  acetaminophen     Tablet .. 650 sarahi GRAM(s) Oral every 6 hours PRN Moderate Pain (4 - 6)  aluminum hydroxide/magnesium hydroxide/simethicone Suspension 30 sarahi Liter(s) Oral every 4 hours PRN Dyspepsia  Diphenhydramine 25 sarahi GRAM(s) Oral every 4 hours PRN Rash and/or Itching  haloperidol     Tablet 1 sarahi GRAM(s) Oral every 8 hours PRN severe agitation  haloperidol    Injectable 2 sarahi GRAM(s) Intramuscular every 12 hours PRN Agitation  hydroxyzine hydrochloride 25 sarahi GRAM(s) Oral every 6 hours PRN anxiety/insomnia  Lorazepam     Tablet 1 sarahi GRAM(s) Oral every 12 hours PRN Anxiety  melatonin. 3 sarahi GRAM(s) Oral at bedtime PRN Insomnia  polyethylene glycol 3350 17 Gram(s) Oral daily PRN constipation  Trazadone 25 sarahi GRAM(s) Oral at bedtime PRN insomnia  4. labs wnl lipid profile, HBGAIC, urine c+s, ordered for 11/28, Liver hepatic profile ordered for 11/28 am  5. consult for nutrition ordered  6. ekg 472/438 ms nsr  7. individual and group psychotherapy  8. discharge patient when clinically appropriate

## 2022-11-25 NOTE — BH SOCIAL WORK INITIAL PSYCHOSOCIAL EVALUATION - OTHER PAST PSYCHIATRIC HISTORY (INCLUDE DETAILS REGARDING ONSET, COURSE OF ILLNESS, INPATIENT/OUTPATIENT TREATMENT)
Pt is a 27 yr old single woman with a history of depression, anxiety and substance misuse, currently in treatment at [facility] and with Dr. Schafer in [place]. 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 She has no previous admissions but one prior OD on heroin 8 years ago which she identifies as a suicide attempt (did not tell anyone nor seek help afterwards). Currently she reports worsening suicidal ideation and inability to function. She cries every day, has not been showering, feels hopeless and does not want to live anymore. Denies active ideation or plan to harm self.

## 2022-11-25 NOTE — BH INPATIENT PSYCHIATRY PROGRESS NOTE - NSBHFUPINTERVALHXFT_PSY_A_CORE
pt seen for depression and SI. VSS chart reviewed and case discussed with treatment team.  The patient states she has been feeling depressed for about  couple of months.  She states she has had decrease appetite, with unintentional weight loss of thirty seven pounds in two months. she states have trouble  with sleep, concentration, loss of pleasurable interests, decreased motivation.   She states what has overwhelmed her is the possibility of having to move  from her apartment.  Also, the holidays are coming and she is missing her mom, who had passed away five years ago.   She states nothing has helped  her symptoms.  patient reporting passive suicidal ideation, denies homicidal ideations.  Patient had reported composing letters abut  denies auditory and visual hallucinations, denies access to lethal weapons, denies paranoia, patient endorse intermittent nightmares, endorse trauma, did not report. pt seen for depression and SI. VSS chart reviewed and case discussed with treatment team.  The patient states she has been feeling depressed for about  couple of months.  She states she has had decrease appetite, with unintentional weight loss of thirty seven pounds in two months. she states have trouble  with sleep, concentration, loss of pleasurable interests, decreased motivation.   She states what has overwhelmed her is the possibility of having to move  from her apartment.  Also, the holidays are coming and she is missing her mom, who had passed away five years ago.   She states nothing has helped  her symptoms.  patient reporting passive suicidal ideation, denies homicidal ideations.  Patient had reported composing letters abut  denies auditory and visual hallucinations, denies access to lethal weapons, denies paranoia, patient endorse intermittent nightmares, endorse trauma, did not report.  past psychiatry history  depression and anxiety, medications used in the past Remeron, Seroquel, Zoloft, Gabapentin.  Past family psychiatric history/ substance use  mother depression  and alcoholism, father alcoholism. past medical history denies, substance use Heroin years ago, patient reporting having smoked crack cocaine two weeks ago.  legal court case pt reported had held friends clothes and got pulled over and found drugs in friends property.   lives in [place], with friend, went to [employer]  [employer].   She enjoy going for walks with dogs. pt seen for depression and SI. VSS chart reviewed and case discussed with treatment team.  The patient states she has been feeling depressed for about  couple of months.  She states she has had decrease appetite, with unintentional weight loss of thirty seven pounds in two months. she states have trouble  with sleep, concentration, loss of pleasurable interests, decreased motivation.   She states what has overwhelmed her is the possibility of having to move  from her apartment.  Also, the holidays are coming and she is missing her mom, who had passed away five years ago.   She states nothing has helped  her symptoms.  patient reporting passive suicidal ideation, denies homicidal ideations.  Patient had reported composing suicide letters.  denies auditory and visual hallucinations, denies access to lethal weapons, denies paranoia, patient endorse intermittent nightmares, endorse trauma.  past psychiatry history  depression and anxiety, medications used in the past Remeron, Seroquel, Zoloft, Gabapentin.  Past family psychiatric history/ substance use  mother depression  and alcoholism, father alcoholism. past medical history denies, substance use Heroin years ago, patient reporting having smoked crack cocaine two weeks ago.  legal court case pt reported had held friends clothes and got pulled over and found drugs in friends property.   lives in [place], with friend, went to [employer]  school [employer].   She enjoy going for walks with dogs.

## 2022-11-25 NOTE — BH INPATIENT PSYCHIATRY PROGRESS NOTE - MSE UNSTRUCTURED FT
Appearance: Dressed appropriately.  Behavior: Cooperative.    Motor: Psychomotor slowing.  Speech: Regular rate.  Mood: "Depressed."  Affect: Depressed, tearful.  Thought Process: Linear.  Associations: Fair.  Thought Content: Intermittent SI, but no current intent or plan.   Insight: Fair.   Judgment: Fair on interview.  Attention: Fair.  Language: Fluent.  Gait: Intact.  Appearance: pt dressed in hospital and casual attire fair hygiene  Behavior: anxious, tearful but cooperative  Motor: no agitation noted  Speech: normal in rate, rhythm and volume  Mood: sad  Affect: Depressed  Thought Process: Linear.  Associations: Fair.  Thought Content: passive SI/ denies HI  Insight: Fair.   Judgment: Fair  Attention: fair  Language: Fluent.  Gait: steady gait Appearance: pt dressed in hospital gown, fair hygiene  Behavior: anxious, tearful but cooperative  Motor: no agitation noted  Speech: normal in rate, rhythm and volume  Mood: sad  Affect: Depressed and mood congruent  Thought Process: Linear.  Associations: Fair.  Thought Content: passive SI/ denies HI  Insight: Fair.   Judgment: Fair  Attention: fair  Language: Fluent.  Gait: steady gait

## 2022-11-25 NOTE — BH INPATIENT PSYCHIATRY PROGRESS NOTE - ATTENDING COMMENTS
Pt reports one SA by OD on heroin few years prior,  woke up not being dead at that time.  Pt states she is in therapist at methadone clinic though did not disclose her SI to the staff because she did not want them to intervene. She had thought about [place] and walked passed few times, wrote a suicide note on the phone though was encouraged to come to ED by one of her friend. Pt is interested in starting a new antidepressant.    LFTs elevated.  might be due to hepatitis C.  Will recheck LFTs after starting Lexparo on 11/28.

## 2022-11-25 NOTE — PSYCHIATRIC REHAB INITIAL EVALUATION - NSBHEDULITERACY_PSY_ALL_CORE
The writer was unable to assess due to the patient not participating in the session./Able to read and write English

## 2022-11-25 NOTE — BH INPATIENT PSYCHIATRY PROGRESS NOTE - NSBHATTESTBILLINGAW_PSY_A_CORE
[id number] Inpatient care - low complexity - 15 minutes [id number] Inpatient care - high complexity - 35 minutes

## 2022-11-25 NOTE — PSYCHIATRIC REHAB INITIAL EVALUATION - NSBHPRRECOMMEND_PSY_ALL_CORE
The writer attempted to meet with the patient to orient her to the psychiatric rehabilitation staff and services, however, the patient did not participate in the session. The writer retrieved the patient’s information from her chart. As per the patient’s chart, she was BIB her friend for worsening depression and SI. The writer established a psychiatric rehabilitation goal for the patient to work on over the next seven days. The patient’s psychiatric rehabilitation goal is to identify 2 coping skills that assist in improving mood for her depressive symptoms goal. Over the next seven days, Psychiatric Rehabilitation staff will utilize individual psychotherapy and psychoeducation to assist the patient in reaching her treatment goal. The writer was unable to assess for SI/HI/AH/VH due to the patient not participating in the admission session.

## 2022-11-26 RX ADMIN — METHADONE HYDROCHLORIDE 20 MILLIGRAM(S): 40 TABLET ORAL at 09:11

## 2022-11-26 RX ADMIN — Medication 10 MILLIGRAM(S): at 21:52

## 2022-11-26 RX ADMIN — Medication 1 PATCH: at 09:12

## 2022-11-26 RX ADMIN — METHADONE HYDROCHLORIDE 80 MILLIGRAM(S): 40 TABLET ORAL at 09:11

## 2022-11-26 RX ADMIN — ESCITALOPRAM OXALATE 5 MILLIGRAM(S): 10 TABLET, FILM COATED ORAL at 09:11

## 2022-11-27 RX ADMIN — METHADONE HYDROCHLORIDE 20 MILLIGRAM(S): 40 TABLET ORAL at 09:16

## 2022-11-27 RX ADMIN — Medication 1 PATCH: at 09:19

## 2022-11-27 RX ADMIN — METHADONE HYDROCHLORIDE 80 MILLIGRAM(S): 40 TABLET ORAL at 09:16

## 2022-11-27 RX ADMIN — Medication 1 PATCH: at 07:51

## 2022-11-27 RX ADMIN — ESCITALOPRAM OXALATE 5 MILLIGRAM(S): 10 TABLET, FILM COATED ORAL at 09:15

## 2022-11-27 RX ADMIN — Medication 1 MILLIGRAM(S): at 12:14

## 2022-11-27 RX ADMIN — Medication 10 MILLIGRAM(S): at 20:33

## 2022-11-27 RX ADMIN — Medication 1 PATCH: at 18:52

## 2022-11-28 LAB
A1C WITH ESTIMATED AVERAGE GLUCOSE RESULT: 4.8 % — SIGNIFICANT CHANGE UP (ref 4–5.6)
ALBUMIN SERPL ELPH-MCNC: 4.1 G/DL — SIGNIFICANT CHANGE UP (ref 3.3–5)
ALP SERPL-CCNC: 90 U/L — SIGNIFICANT CHANGE UP (ref 40–120)
ALT FLD-CCNC: 56 U/L — HIGH (ref 4–33)
AST SERPL-CCNC: 67 U/L — HIGH (ref 4–32)
BILIRUB DIRECT SERPL-MCNC: <0.2 MG/DL — SIGNIFICANT CHANGE UP (ref 0–0.3)
BILIRUB INDIRECT FLD-MCNC: >0.1 MG/DL — SIGNIFICANT CHANGE UP (ref 0–1)
BILIRUB SERPL-MCNC: 0.3 MG/DL — SIGNIFICANT CHANGE UP (ref 0.2–1.2)
CHOLEST SERPL-MCNC: 209 MG/DL — HIGH
ESTIMATED AVERAGE GLUCOSE: 91 — SIGNIFICANT CHANGE UP
HDLC SERPL-MCNC: 65 MG/DL — SIGNIFICANT CHANGE UP
LIPID PNL WITH DIRECT LDL SERPL: 124 MG/DL — HIGH
NON HDL CHOLESTEROL: 144 MG/DL — HIGH
PROT SERPL-MCNC: 7.5 G/DL — SIGNIFICANT CHANGE UP (ref 6–8.3)
TRIGL SERPL-MCNC: 101 MG/DL — SIGNIFICANT CHANGE UP

## 2022-11-28 PROCEDURE — 99231 SBSQ HOSP IP/OBS SF/LOW 25: CPT

## 2022-11-28 RX ORDER — ESCITALOPRAM OXALATE 10 MG/1
10 TABLET, FILM COATED ORAL DAILY
Refills: 0 | Status: DISCONTINUED | OUTPATIENT
Start: 2022-11-29 | End: 2022-12-05

## 2022-11-28 RX ORDER — TRAZODONE HCL 50 MG
50 TABLET ORAL AT BEDTIME
Refills: 0 | Status: DISCONTINUED | OUTPATIENT
Start: 2022-11-28 | End: 2022-12-05

## 2022-11-28 RX ORDER — ESCITALOPRAM OXALATE 10 MG/1
5 TABLET, FILM COATED ORAL ONCE
Refills: 0 | Status: COMPLETED | OUTPATIENT
Start: 2022-11-28 | End: 2022-11-28

## 2022-11-28 RX ADMIN — ESCITALOPRAM OXALATE 5 MILLIGRAM(S): 10 TABLET, FILM COATED ORAL at 09:17

## 2022-11-28 RX ADMIN — METHADONE HYDROCHLORIDE 20 MILLIGRAM(S): 40 TABLET ORAL at 09:18

## 2022-11-28 RX ADMIN — METHADONE HYDROCHLORIDE 80 MILLIGRAM(S): 40 TABLET ORAL at 09:23

## 2022-11-28 RX ADMIN — Medication 1 PATCH: at 09:24

## 2022-11-28 RX ADMIN — ESCITALOPRAM OXALATE 5 MILLIGRAM(S): 10 TABLET, FILM COATED ORAL at 11:52

## 2022-11-28 RX ADMIN — Medication 1 MILLIGRAM(S): at 13:21

## 2022-11-28 RX ADMIN — Medication 10 MILLIGRAM(S): at 20:50

## 2022-11-28 NOTE — BH INPATIENT PSYCHIATRY PROGRESS NOTE - NSBHASSESSSUMMFT_PSY_ALL_CORE
The patient is 27 years old single, unemployed female, non-caregiver, who resides wit her "older friend" ; PMH of Hep C, PPH of depression, anxiety, no psych admissions, but one SA buy OD on drugs "8 years ago , I did not tell anyone" as per the pt,  she has history of opioid/cocaine dependence, currently in treatment in Methadone maintenance program at [facility], under care of Dr Schafer. She denies any Hx of violence, no legal issues. Patient was BIB her friend for worsening of depression and SI.    upon interview patient appears a little brighter but reports a little anxious.  patient is compliant with medications. pt engaged in groups.  pt denies AH/ VH  patient currently denies active SI/HI.   patient requiring inpatient hospitalization.  will increase lexapro        1. legal status 9.13  2. no need for constant observation pt denies active SI/HI  3. increase escitalopram 10milligrams Oral daily,  extra dose 5mg x1 now given today  melatonin 10 sarahi GRAM(s) Oral at bedtime  methadone    Tablet 20 sarahi GRAM(s) Oral daily  methadone   Dispersible Tablet 80 sarahi GRAM(s) Oral daily  nicotine - 21 mG/24Hr(s) Patch 1 Patch Transdermal daily  acetaminophen     Tablet .. 650 sarahi GRAM(s) Oral every 6 hours PRN Moderate Pain (4 - 6)  aluminum hydroxide/magnesium hydroxide/simethicone Suspension 30 sarahi Liter(s) Oral every 4 hours PRN Dyspepsia  Diphenhydramine 25 sarahi GRAM(s) Oral every 4 hours PRN Rash and/or Itching  haloperidol     Tablet 1 sarahi GRAM(s) Oral every 8 hours PRN severe agitation  haloperidol    Injectable 2 sarahi GRAM(s) Intramuscular every 12 hours PRN Agitation  hydroxyzine hydrochloride 25 sarahi GRAM(s) Oral every 6 hours PRN anxiety/insomnia  Lorazepam     Tablet 1 sarahi GRAM(s) Oral every 12 hours PRN Anxiety  melatonin. 3 sarahi GRAM(s) Oral at bedtime PRN Insomnia  polyethylene glycol 3350 17 Gram(s) Oral daily PRN constipation  Trazadone 25 sarahi GRAM(s) Oral at bedtime PRN insomnia  4. labs wnl , ordered for 11/28, Liver hepatic profile slightly elevated. noted mildly hemolyzed  HBGAIC 4.8  5. consult for nutrition order  6. ekg 472/438 ms nsr  7. individual and group psychotherapy  8. discharge patient when clinically appropriate

## 2022-11-28 NOTE — BH INPATIENT PSYCHIATRY PROGRESS NOTE - NSBHFUPINTERVALHXFT_PSY_A_CORE
pt seen for f/u for depression.  VSS chart reviewed and case discussed with treatment team.  pt is compliant with medication denies SE.  pt appears a little brighter.  discussed with patient if would like to increase medications.  pt verbalized agreement.  pt stating she is still  having trouble with sleep.  pt eating better.  pt stated had went to art therapy.  pt denies suicidal and homicidal ideation.  pt denies auditory  and visual hallucinations. pt reporting worsening anxiety but, depression is a little better.

## 2022-11-28 NOTE — DIETITIAN INITIAL EVALUATION ADULT - FACTORS AFF FOOD INTAKE
substance abuse/difficulty chewing/persistent lack of appetite/Yazidi/ethnic/cultural/personal food preferences

## 2022-11-28 NOTE — DIETITIAN INITIAL EVALUATION ADULT - PERTINENT MEDS FT
MEDICATIONS  (STANDING):  escitalopram 5 milliGRAM(s) Oral once  melatonin 10 milliGRAM(s) Oral at bedtime  methadone    Tablet 20 milliGRAM(s) Oral daily  methadone   Dispersible Tablet 80 milliGRAM(s) Oral daily  nicotine - 21 mG/24Hr(s) Patch 1 Patch Transdermal daily    MEDICATIONS  (PRN):  acetaminophen     Tablet .. 650 milliGRAM(s) Oral every 6 hours PRN Moderate Pain (4 - 6)  aluminum hydroxide/magnesium hydroxide/simethicone Suspension 30 milliLiter(s) Oral every 4 hours PRN Dyspepsia  diphenhydrAMINE 25 milliGRAM(s) Oral every 4 hours PRN Rash and/or Itching  haloperidol     Tablet 1 milliGRAM(s) Oral every 8 hours PRN severe agitation  haloperidol    Injectable 2 milliGRAM(s) IntraMuscular every 12 hours PRN Agitation  hydrOXYzine hydrochloride 25 milliGRAM(s) Oral every 6 hours PRN anxiety/insomnia  LORazepam     Tablet 1 milliGRAM(s) Oral every 12 hours PRN Anxiety  melatonin. 3 milliGRAM(s) Oral at bedtime PRN Insomnia  polyethylene glycol 3350 17 Gram(s) Oral daily PRN constipation  traZODone 50 milliGRAM(s) Oral at bedtime PRN insomnia

## 2022-11-28 NOTE — DIETITIAN INITIAL EVALUATION ADULT - OTHER INFO
Patient is a 28 y/o female with PMH of Hep C, PPH of depression, anxiety, no psych admissions, but one SA buy OD on drugs per pt, history of opioid/cocaine dependence, currently in treatment in Methadone maintenance program at [facility], BIB her friend for worsening of depression and SI.    Writer met with patient in the dining area today. Patient reports current appetite is "better" since adm to [facility], with PO intake ~50-75% at meals. Patient states she is a picky eater, and she likes to have foods that are softer due to use of upper dentures, but declines diet consistency/texture modification, prefers to have regular diet here. Denies swallowing difficulty at meals. Food/menu options explored and reviewed with patient. New food preferences taken and implemented. Patient denies GI distress (nausea/vomiting/diarrhea/constipation). Elevated cholesterol and LDL noted, possibly related to unhealthy food choices in setting of depression and substance abuse. Writer briefly discussed healthy eating with patient to encourage intake of fruits/vegetables, patient verbalized understanding. Patient has no questions about her diet at this time.

## 2022-11-28 NOTE — DIETITIAN INITIAL EVALUATION ADULT - ORAL INTAKE PTA/DIET HISTORY
Per chart review, patient states she has had decrease appetite, with unintentional weight loss of 37 pounds in two months. However, during encounter today, patient denies recent wt loss but unable to recall her UBW. Per HIE, patient's wt was 59kg/130lb last year in Nov 2021, current adm wt 50.8kg/112lb (11/24/2021). NKFA reported. Patient reports + upper denture, prefers to have soft foods, but ok with regular diet here. Endorses she also takes a bottle of protein shake her friend bought her but she cannot recall the brand.

## 2022-11-28 NOTE — DIETITIAN INITIAL EVALUATION ADULT - PERTINENT LABORATORY DATA
11-28 Alb 4.1 g/dL     TPro  7.5  /  Alb  4.1  /  TBili  0.3  /  DBili  <0.2  /  AST  67<H>  /  ALT  56<H>  /  AlkPhos  90  11-28  A1C with Estimated Average Glucose Result: 4.8 % (11-28-22 @ 09:17)    Lipid Panel: Date/Time: 11-28-22 @ 09:17  Cholesterol, Serum: 209  Direct LDL: --  HDL Cholesterol, Serum: 65  Total Cholesterol/HDL Ration Measurement: --  Triglycerides, Serum: 101

## 2022-11-28 NOTE — BH INPATIENT PSYCHIATRY PROGRESS NOTE - MSE UNSTRUCTURED FT
Appearance: pt dressed in hospital gown, fair hygiene  Behavior: calm and cooperative  Motor: no agitation noted  Speech: normal in rate, rhythm and volume  Mood: little anxious  Affect: Depressed and mood congruent  Thought Process: Linear.  Associations: Fair.  Thought Content: passive SI/ denies HI  Insight: Fair.   Judgment: Fair  Attention: fair  Language: Fluent.  Gait: steady gait

## 2022-11-29 PROCEDURE — 99231 SBSQ HOSP IP/OBS SF/LOW 25: CPT

## 2022-11-29 RX ORDER — METHADONE HYDROCHLORIDE 40 MG/1
20 TABLET ORAL DAILY
Refills: 0 | Status: DISCONTINUED | OUTPATIENT
Start: 2022-11-29 | End: 2022-12-05

## 2022-11-29 RX ORDER — METHADONE HYDROCHLORIDE 40 MG/1
80 TABLET ORAL DAILY
Refills: 0 | Status: DISCONTINUED | OUTPATIENT
Start: 2022-11-29 | End: 2022-12-05

## 2022-11-29 RX ADMIN — METHADONE HYDROCHLORIDE 80 MILLIGRAM(S): 40 TABLET ORAL at 08:36

## 2022-11-29 RX ADMIN — Medication 1 PATCH: at 08:40

## 2022-11-29 RX ADMIN — Medication 50 MILLIGRAM(S): at 21:33

## 2022-11-29 RX ADMIN — METHADONE HYDROCHLORIDE 20 MILLIGRAM(S): 40 TABLET ORAL at 08:37

## 2022-11-29 RX ADMIN — Medication 1 MILLIGRAM(S): at 15:32

## 2022-11-29 RX ADMIN — ESCITALOPRAM OXALATE 10 MILLIGRAM(S): 10 TABLET, FILM COATED ORAL at 08:37

## 2022-11-29 NOTE — BH INPATIENT PSYCHIATRY PROGRESS NOTE - NSBHFUPINTERVALHXFT_PSY_A_CORE
pt seen f/u for depression.   pt  calm and cooperative.  pt still little depressed but, still anxious.   pt engaged in groups and participates.  pt compliant with medication denies SE.  pt denies suicidal/ homicidal ideation.  pt denies auditory/ visual hallucinations.  pt stating her mood  is " good'.  pt stated she had broken sleep.

## 2022-11-29 NOTE — BH TREATMENT PLAN - NSTXDEPRESINTERPR_PSY_ALL_CORE
The writer established a psychiatric rehabilitation goal for the patient to work on over the next seven days. The patient’s psychiatric rehabilitation goal is to identify 2 coping skills that assist in improving mood for her depressive symptoms goal. Over the next seven days, Psychiatric Rehabilitation staff will utilize individual psychotherapy and psychoeducation to assist the patient in reaching her treatment goal. The writer was unable to assess for SI/HI/AH/VH due to the patient not participating in the admission session.
The writer established a psychiatric rehabilitation goal for the patient to work on over the next seven days. The patient’s psychiatric rehabilitation goal is to identify 2 coping skills that assist in improving mood for her depressive symptoms goal. Over the next seven days, Psychiatric Rehabilitation staff will utilize individual psychotherapy and psychoeducation to assist the patient in reaching her treatment goal. The writer was unable to assess for SI/HI/AH/VH due to the patient not participating in the admission session.

## 2022-11-29 NOTE — BH TREATMENT PLAN - NSDCCRITERIA_PSY_ALL_CORE
When pt is no longer an acute or imminent risk of harm to self or others, and is able to care for self safely, pt may then be discharged.  96

## 2022-11-29 NOTE — BH INPATIENT PSYCHIATRY PROGRESS NOTE - MSE UNSTRUCTURED FT
Appearance: pt dressed in casual attire fair hygiene  Behavior: little anxious but  cooperative  Motor: no agitation noted  Speech: normal in rate, rhythm and volume  Mood: anxious  Affect: anxious and mood congruent  Thought Process: Linear.  Associations: Fair.  Thought Content: passive SI/ denies HI  Insight: Fair.   Judgment: Fair  Attention: fair  Language: Fluent.  Gait: steady gait

## 2022-11-29 NOTE — BH TREATMENT PLAN - NSTXDCOPLKINTERSW_PSY_ALL_CORE
SW will explore PROS programs and psychosocial programs in pt's area.
SW will explore PROS programs and psychosocial programs in pt's area.

## 2022-11-29 NOTE — BH TREATMENT PLAN - NSTXPLANSTARTDATE_PSY_ALL_CORE
28-Nov-2022
29-Nov-2022
A: 31yo multiparity desires permanent sterilization as form of contraception, IUD removal    P: admit      NPO      IV hydration      anesthesia to be consulted       venodyne compression in OR      on call to OR       anticipate d/c home and follow up in office in 2 wks or PRN

## 2022-11-29 NOTE — BH INPATIENT PSYCHIATRY PROGRESS NOTE - NSBHASSESSSUMMFT_PSY_ALL_CORE
The patient is 27 years old single, unemployed female, non-caregiver, who resides wit her "older friend" ; PMH of Hep C, PPH of depression, anxiety, no psych admissions, but one SA buy OD on drugs "8 years ago , I did not tell anyone" as per the pt,  she has history of opioid/cocaine dependence, currently in treatment in Methadone maintenance program at [facility], under care of Dr Schafer. She denies any Hx of violence, no legal issues. Patient was BIB her friend for worsening of depression and SI.    upon interview patient appears a little brighter but reports a little anxious.  patient is compliant with medications. pt engaged in groups.  pt denies AH/ VH  patient denies SI/HI  patient requiring inpatient hospitalization.           1. legal status 9.13  2. no need for constant observation pt denies active SI/HI  3. continue with  escitalopram 10milligrams Oral daily  melatonin 10 sarahi GRAM(s) Oral at bedtime  methadone    Tablet 20 sarahi GRAM(s) Oral daily  methadone   Dispersible Tablet 80 sarahi GRAM(s) Oral daily  nicotine - 21 mG/24Hr(s) Patch 1 Patch Transdermal daily  acetaminophen     Tablet .. 650 sarahi GRAM(s) Oral every 6 hours PRN Moderate Pain (4 - 6)  aluminum hydroxide/magnesium hydroxide/simethicone Suspension 30 sarahi Liter(s) Oral every 4 hours PRN Dyspepsia  Diphenhydramine 25 sarahi GRAM(s) Oral every 4 hours PRN Rash and/or Itching  haloperidol     Tablet 1 sarahi GRAM(s) Oral every 8 hours PRN severe agitation  haloperidol    Injectable 2 sarahi GRAM(s) Intramuscular every 12 hours PRN Agitation  hydroxyzine hydrochloride 25 sarahi GRAM(s) Oral every 6 hours PRN anxiety/insomnia  Lorazepam     Tablet 1 sarahi GRAM(s) Oral every 12 hours PRN Anxiety  melatonin. 3 sarahi GRAM(s) Oral at bedtime PRN Insomnia  polyethylene glycol 3350 17 Gram(s) Oral daily PRN constipation  Trazadone 25 sarahi GRAM(s) Oral at bedtime PRN insomnia  4. labs wnl , ordered for 11/28, Liver hepatic profile slightly elevated. noted mildly hemolyzed  HBGAIC 4.8  5. consult for nutrition order  6. ekg 472/438 ms nsr  7. individual and group psychotherapy  8. discharge patient when clinically appropriate

## 2022-11-30 PROCEDURE — 99231 SBSQ HOSP IP/OBS SF/LOW 25: CPT

## 2022-11-30 RX ADMIN — METHADONE HYDROCHLORIDE 80 MILLIGRAM(S): 40 TABLET ORAL at 09:14

## 2022-11-30 RX ADMIN — ESCITALOPRAM OXALATE 10 MILLIGRAM(S): 10 TABLET, FILM COATED ORAL at 09:49

## 2022-11-30 RX ADMIN — Medication 10 MILLIGRAM(S): at 21:04

## 2022-11-30 RX ADMIN — METHADONE HYDROCHLORIDE 20 MILLIGRAM(S): 40 TABLET ORAL at 09:14

## 2022-11-30 RX ADMIN — Medication 50 MILLIGRAM(S): at 21:33

## 2022-11-30 RX ADMIN — Medication 1 PATCH: at 09:14

## 2022-11-30 NOTE — BH INPATIENT PSYCHIATRY PROGRESS NOTE - NSBHASSESSSUMMFT_PSY_ALL_CORE
The patient is 27 years old single, unemployed female, non-caregiver, who resides wit her "older friend" ; PMH of Hep C, PPH of depression, anxiety, no psych admissions, but one SA buy OD on drugs "8 years ago , I did not tell anyone" as per the pt,  she has history of opioid/cocaine dependence, currently in treatment in Methadone maintenance program at [facility], under care of Dr Schafer. She denies any Hx of violence, no legal issues. Patient was BIB her friend for worsening of depression and SI.    upon interview patient appears a little tired but cooperative during interview.  pt would like to speak with SW regarding after care.   patient is compliant with medications. pt engaged in groups.  pt denies AH/ VH  patient denies SI/HI  patient requiring inpatient hospitalization.           1. legal status 9.13  2. no need for constant observation pt denies active SI/HI  3. continue with  escitalopram 10milligrams Oral daily  melatonin 10 sarahi GRAM(s) Oral at bedtime  methadone    Tablet 20 sarahi GRAM(s) Oral daily  methadone   Dispersible Tablet 80 sarahi GRAM(s) Oral daily  nicotine - 21 mG/24Hr(s) Patch 1 Patch Transdermal daily  acetaminophen     Tablet .. 650 sarahi GRAM(s) Oral every 6 hours PRN Moderate Pain (4 - 6)  aluminum hydroxide/magnesium hydroxide/simethicone Suspension 30 sarahi Liter(s) Oral every 4 hours PRN Dyspepsia  Diphenhydramine 25 sarahi GRAM(s) Oral every 4 hours PRN Rash and/or Itching  haloperidol     Tablet 1 sarahi GRAM(s) Oral every 8 hours PRN severe agitation  haloperidol    Injectable 2 sarahi GRAM(s) Intramuscular every 12 hours PRN Agitation  hydroxyzine hydrochloride 25 sarahi GRAM(s) Oral every 6 hours PRN anxiety/insomnia  Lorazepam     Tablet 1 saraih GRAM(s) Oral every 12 hours PRN Anxiety  melatonin. 3 sarahi GRAM(s) Oral at bedtime PRN Insomnia  polyethylene glycol 3350 17 Gram(s) Oral daily PRN constipation  Trazadone 25 sarahi GRAM(s) Oral at bedtime PRN insomnia  4. labs wnl, hepatic profile ordered for 12/1  5. consult for nutrition order  6. ekg 472/438 ms nsr  7. individual and group psychotherapy  8. discharge patient when clinically appropriate

## 2022-11-30 NOTE — BH INPATIENT PSYCHIATRY PROGRESS NOTE - NSBHFUPINTERVALHXFT_PSY_A_CORE
patient seen for f/u for depression.  chart reviewed and case discussed with treatment team.  pt report with sleeping on/off.  Discussed with  patient regarding sleeping during the day.  She indicated she had, and not because of medication.    pt is compliant with medication denies   SE.  pt engaged in groups.  pt reported better appetite.  pt reporting she feels less depressive.   pt denies SI/HI, denies AH/VH.  pt  requesting SW regarding after care.   discussed with SW

## 2022-11-30 NOTE — BH INPATIENT PSYCHIATRY PROGRESS NOTE - MSE UNSTRUCTURED FT
Appearance: pt dressed in casual attire fair hygiene  Behavior: little anxious but  cooperative  Motor: no agitation noted  Speech: normal in rate, rhythm and volume  Mood: " tired"  Affect: anxious and mood congruent  Thought Process: Linear.  Associations: Fair.  Thought Content: passive SI/ denies HI  Insight: Fair.   Judgment: Fair  Attention: fair  Language: Fluent.  Gait: steady gait

## 2022-12-01 PROBLEM — F32.9 MAJOR DEPRESSIVE DISORDER, SINGLE EPISODE, UNSPECIFIED: Chronic | Status: ACTIVE | Noted: 2022-11-23

## 2022-12-01 PROBLEM — F41.9 ANXIETY DISORDER, UNSPECIFIED: Chronic | Status: ACTIVE | Noted: 2022-11-23

## 2022-12-01 LAB
ALBUMIN SERPL ELPH-MCNC: 4.2 G/DL — SIGNIFICANT CHANGE UP (ref 3.3–5)
ALP SERPL-CCNC: 89 U/L — SIGNIFICANT CHANGE UP (ref 40–120)
ALT FLD-CCNC: 67 U/L — HIGH (ref 4–33)
AST SERPL-CCNC: 79 U/L — HIGH (ref 4–32)
BILIRUB DIRECT SERPL-MCNC: <0.2 MG/DL — SIGNIFICANT CHANGE UP (ref 0–0.3)
BILIRUB INDIRECT FLD-MCNC: >0.2 MG/DL — SIGNIFICANT CHANGE UP (ref 0–1)
BILIRUB SERPL-MCNC: 0.4 MG/DL — SIGNIFICANT CHANGE UP (ref 0.2–1.2)
PROT SERPL-MCNC: 7.8 G/DL — SIGNIFICANT CHANGE UP (ref 6–8.3)

## 2022-12-01 PROCEDURE — 99231 SBSQ HOSP IP/OBS SF/LOW 25: CPT

## 2022-12-01 RX ADMIN — METHADONE HYDROCHLORIDE 20 MILLIGRAM(S): 40 TABLET ORAL at 09:51

## 2022-12-01 RX ADMIN — Medication 10 MILLIGRAM(S): at 20:34

## 2022-12-01 RX ADMIN — METHADONE HYDROCHLORIDE 80 MILLIGRAM(S): 40 TABLET ORAL at 09:51

## 2022-12-01 RX ADMIN — ESCITALOPRAM OXALATE 10 MILLIGRAM(S): 10 TABLET, FILM COATED ORAL at 09:51

## 2022-12-01 RX ADMIN — Medication 1 PATCH: at 09:55

## 2022-12-01 RX ADMIN — Medication 50 MILLIGRAM(S): at 20:34

## 2022-12-01 RX ADMIN — Medication 1 MILLIGRAM(S): at 12:21

## 2022-12-01 NOTE — BH INPATIENT PSYCHIATRY PROGRESS NOTE - NSBHFUPINTERVALHXFT_PSY_A_CORE
pt seen for f/u for suicide.  VSS chart reviewed and case discussed with treatment team.  pt feeling better with her depression.  pt with  some compliant with anxiety.  patient would like frequency of Ativan increased.  Discussed with her about her history of substance use.  pt is compliant with medications denies SE.  pt denies AH/VH, pt denies SI/HI.  pt goes to groups and participates.  SW had discussed  with her regarding PHP and she would like to do program

## 2022-12-01 NOTE — BH INPATIENT PSYCHIATRY PROGRESS NOTE - MSE UNSTRUCTURED FT
Appearance: pt dressed in casual attire fair hygiene  Behavior: little anxious but  cooperative  Motor: no agitation noted  Speech: normal in rate, rhythm and volume  Mood: "ok"  Affect: anxious and mood congruent  Thought Process: Linear.  Associations: Fair.  Thought Content: passive SI/ denies HI  Insight: Fair.   Judgment: Fair  Attention: fair  Language: Fluent.  Gait: steady gait

## 2022-12-01 NOTE — BH INPATIENT PSYCHIATRY PROGRESS NOTE - NSBHASSESSSUMMFT_PSY_ALL_CORE
The patient is 27 years old single, unemployed female, non-caregiver, who resides wit her "older friend" ; PMH of Hep C, PPH of depression, anxiety, no psych admissions, but one SA buy OD on drugs "8 years ago , I did not tell anyone" as per the pt,  she has history of opioid/cocaine dependence, currently in treatment in Methadone maintenance program at [facility], under care of Dr Schafer. She denies any Hx of violence, no legal issues. Patient was BIB her friend for worsening of depression and SI.    upon interview patient report feeling better with depression but, still a little anxious. SW discussed with her regarding PHP. pt would like to do program  patient is compliant with medications. pt engaged in groups.  pt denies AH/ VH  patient denies SI/HI  patient requiring inpatient hospitalization.           1. legal status 9.13  2. no need for constant observation pt denies active SI/HI  3. continue with  escitalopram 10milligrams Oral daily  melatonin 10 sarahi GRAM(s) Oral at bedtime  methadone    Tablet 20 sarahi GRAM(s) Oral daily  methadone   Dispersible Tablet 80 sarahi GRAM(s) Oral daily  nicotine - 21 mG/24Hr(s) Patch 1 Patch Transdermal daily  acetaminophen     Tablet .. 650 sarahi GRAM(s) Oral every 6 hours PRN Moderate Pain (4 - 6)  aluminum hydroxide/magnesium hydroxide/simethicone Suspension 30 sarahi Liter(s) Oral every 4 hours PRN Dyspepsia  Diphenhydramine 25 sarahi GRAM(s) Oral every 4 hours PRN Rash and/or Itching  haloperidol     Tablet 1 sarahi GRAM(s) Oral every 8 hours PRN severe agitation  haloperidol    Injectable 2 sarahi GRAM(s) Intramuscular every 12 hours PRN Agitation  hydroxyzine hydrochloride 25 sarahi GRAM(s) Oral every 6 hours PRN anxiety/insomnia  Lorazepam     Tablet 1 sarahi GRAM(s) Oral every 12 hours PRN Anxiety  melatonin. 3 sarahi GRAM(s) Oral at bedtime PRN Insomnia  polyethylene glycol 3350 17 Gram(s) Oral daily PRN constipation  Trazadone 25 sarahi GRAM(s) Oral at bedtime PRN insomnia  4. labs wnl, hepatic profile ordered for 12/1  5. consult for nutrition order  6. ekg 472/438 ms nsr  7. individual and group psychotherapy  8. discharge patient when clinically appropriate

## 2022-12-02 RX ADMIN — METHADONE HYDROCHLORIDE 80 MILLIGRAM(S): 40 TABLET ORAL at 09:34

## 2022-12-02 RX ADMIN — Medication 1 MILLIGRAM(S): at 20:48

## 2022-12-02 RX ADMIN — METHADONE HYDROCHLORIDE 20 MILLIGRAM(S): 40 TABLET ORAL at 09:33

## 2022-12-02 RX ADMIN — Medication 3 MILLIGRAM(S): at 20:17

## 2022-12-02 RX ADMIN — Medication 1 PATCH: at 09:33

## 2022-12-02 RX ADMIN — Medication 50 MILLIGRAM(S): at 21:24

## 2022-12-02 RX ADMIN — ESCITALOPRAM OXALATE 10 MILLIGRAM(S): 10 TABLET, FILM COATED ORAL at 09:35

## 2022-12-02 NOTE — BH INPATIENT PSYCHIATRY PROGRESS NOTE - NSBHASSESSSUMMFT_PSY_ALL_CORE
The patient is 27 years old single, unemployed female, non-caregiver, who resides wit her "older friend" ; PMH of Hep C, PPH of depression, anxiety, no psych admissions, but one SA buy OD on drugs "8 years ago , I did not tell anyone" as per the pt,  she has history of opioid/cocaine dependence, currently in treatment in Methadone maintenance program at [facility], under care of Dr Schafer. She denies any Hx of violence, no legal issues. Patient was BIB her friend for worsening of depression and SI.    upon interview patient appears a little anxious but cooperative during interview. patient requesting to speak to SW, KELLY will discuss with ARACELI and PAUL US    patient is compliant with medications. pt engaged in groups.  pt denies AH/ VH  patient denies SI/HI  patient requiring inpatient hospitalization.           1. legal status 9.13  2. no need for constant observation pt denies active SI/HI  3. continue with  escitalopram 10milligrams Oral daily  melatonin 10 sarahi GRAM(s) Oral at bedtime  methadone    Tablet 20 sarahi GRAM(s) Oral daily  methadone   Dispersible Tablet 80 sarahi GRAM(s) Oral daily  nicotine - 21 mG/24Hr(s) Patch 1 Patch Transdermal daily  acetaminophen     Tablet .. 650 sarahi GRAM(s) Oral every 6 hours PRN Moderate Pain (4 - 6)  aluminum hydroxide/magnesium hydroxide/simethicone Suspension 30 sarahi Liter(s) Oral every 4 hours PRN Dyspepsia  Diphenhydramine 25 sarahi GRAM(s) Oral every 4 hours PRN Rash and/or Itching  haloperidol     Tablet 1 sarahi GRAM(s) Oral every 8 hours PRN severe agitation  haloperidol    Injectable 2 sarahi GRAM(s) Intramuscular every 12 hours PRN Agitation  hydroxyzine hydrochloride 25 sarahi GRAM(s) Oral every 6 hours PRN anxiety/insomnia  Lorazepam     Tablet 1 sarahi GRAM(s) Oral every 12 hours PRN Anxiety  melatonin. 3 sarahi GRAM(s) Oral at bedtime PRN Insomnia  polyethylene glycol 3350 17 Gram(s) Oral daily PRN constipation  Trazadone 25 sarahi GRAM(s) Oral at bedtime PRN insomnia  4. labs hepatic level ast  5. consult for nutrition order  6. ekg 472/438 ms nsr  7. individual and group psychotherapy  8. discharge patient when clinically appropriate    The patient is 27 years old single, unemployed female, non-caregiver, who resides wit her "older friend" ; PMH of Hep C, PPH of depression, anxiety, no psych admissions, but one SA buy OD on drugs "8 years ago , I did not tell anyone" as per the pt,  she has history of opioid/cocaine dependence, currently in treatment in Methadone maintenance program at [facility], under care of Dr Schafer. She denies any Hx of violence, no legal issues. Patient was BIB her friend for worsening of depression and SI.    upon interview patient appears a little anxious but cooperative during interview. patient requesting to speak to SW, KELLY will discuss with ARACELI and PAUL US    patient is compliant with medications. pt engaged in groups.  pt denies AH/ VH  patient denies SI/HI  patient requiring inpatient hospitalization.           1. legal status 9.13  2. no need for constant observation pt denies active SI/HI  3. continue with  escitalopram 10milligrams Oral daily  melatonin 10 sarahi GRAM(s) Oral at bedtime  methadone    Tablet 20 sarahi GRAM(s) Oral daily  methadone   Dispersible Tablet 80 sarahi GRAM(s) Oral daily  nicotine - 21 mG/24Hr(s) Patch 1 Patch Transdermal daily  acetaminophen     Tablet .. 650 sarahi GRAM(s) Oral every 6 hours PRN Moderate Pain (4 - 6)  aluminum hydroxide/magnesium hydroxide/simethicone Suspension 30 sarahi Liter(s) Oral every 4 hours PRN Dyspepsia  Diphenhydramine 25 sarahi GRAM(s) Oral every 4 hours PRN Rash and/or Itching  haloperidol     Tablet 1 sarahi GRAM(s) Oral every 8 hours PRN severe agitation  haloperidol    Injectable 2 sarahi GRAM(s) Intramuscular every 12 hours PRN Agitation  hydroxyzine hydrochloride 25 sarahi GRAM(s) Oral every 6 hours PRN anxiety/insomnia  Lorazepam     Tablet 1 sarahi GRAM(s) Oral every 12 hours PRN Anxiety  melatonin. 3 sarahi GRAM(s) Oral at bedtime PRN Insomnia  polyethylene glycol 3350 17 Gram(s) Oral daily PRN constipation  Trazadone 25 sarahi GRAM(s) Oral at bedtime PRN insomnia  4. labs hepatic level ast 79/ alt 67 according to medical team reorder for 12/5  5. consult for nutrition order  6. ekg 472/438 ms nsr  7. individual and group psychotherapy  8. discharge patient when clinically appropriate

## 2022-12-02 NOTE — BH PSYCHOLOGY - GROUP THERAPY NOTE - NSBHPSYCHOLRESPONSE_PSY_A_CORE
Coping skills acquired/Insight displayed/Accepted support
Coping skills acquired/Insight displayed/Accepted support

## 2022-12-02 NOTE — BH PSYCHOLOGY - GROUP THERAPY NOTE - TOKEN PULL-DIAGNOSIS
Primary Diagnosis:  Depression [F32.A]        Problem Dx:   Cocaine dependence without complication [F14.20]      Uncomplicated opioid dependence [F11.20]      Generalized anxiety disorder [F41.1]      Severe episode of recurrent major depressive disorder, without psychotic features [F33.2]      
Primary Diagnosis:  Depression [F32.A]        Problem Dx:   Cocaine dependence without complication [F14.20]      Uncomplicated opioid dependence [F11.20]      Generalized anxiety disorder [F41.1]      Severe episode of recurrent major depressive disorder, without psychotic features [F33.2]

## 2022-12-02 NOTE — BH INPATIENT PSYCHIATRY PROGRESS NOTE - MSE UNSTRUCTURED FT
Appearance: pt dressed in casual attire fair hygiene  Behavior: little anxious but  cooperative  Motor: no agitation noted  Speech: normal in rate, rhythm and volume  Mood: "Im doing ok"  Affect: anxious and mood congruent  Thought Process: Linear.  Associations: Fair.  Thought Content: passive SI/ denies HI  Insight: Fair.   Judgment: Fair  Attention: fair  Language: Fluent.  Gait: steady gait

## 2022-12-02 NOTE — BH INPATIENT PSYCHIATRY PROGRESS NOTE - NSBHFUPINTERVALHXFT_PSY_A_CORE
pt seen for f/u for depression and SI.  VSS chart reviewed and case discussed with treatment team.  pt is compliant denies SE.    pt engaged in groups and participates.  pt denies auditory and visual hallucinations.  pt denies suicidal and homicidal ideation.  pt would like to speak to SW about programs.  She is stating she is a little resistant about the programs.  She stated would  like more information regarding the programs.  pt would like to speak to RDALIA.  discussed with both ARACELI, and KELLY

## 2022-12-02 NOTE — BH PSYCHOLOGY - GROUP THERAPY NOTE - NSBHPSYCHOLPARTICIPCOMMENT_PSY_A_CORE FT
Patient arrived on-time to group and during the skills check-in reported that she has been practicing DBT skills, paced breathing and paired muscle relaxation. Patient participated spontaneously by sharing insights related to the group discussion, by asking clarifying questions, and by reading the provided handout. Patient appeared to accept support from group leaders, was cooperative, and offered support to other patients who attended today's group. 
Patient arrived on time to group and during the skills check-in reported that she has been practicing grounding exercises. She participated by reading aloud from the handout when prompted, actively participated in the group discussion on distress tolerance spontaneously and provided relevant personal examples. She appeared to accept support from  and was cooperative.

## 2022-12-02 NOTE — BH PSYCHOLOGY - GROUP THERAPY NOTE - NSPSYCHOLGRPDBTPT_PSY_A_CORE
stable mood and affect in group/patient showing good behavior control/no self-destructive impulses/behaviors/Patient able to identify mood states
stable mood and affect in group/patient showing good behavior control/no self-destructive impulses/behaviors/Patient able to identify mood states

## 2022-12-02 NOTE — CHART NOTE - NSCHARTNOTEFT_GEN_A_CORE
Asked to weigh in on patient's LFT trend.  Noted slight increase over past couple lab draws.   Was seen previously by hepatology for Hepatitis C infection, last note in [facility] on 12/2021, had fibroscan and was meant to be initiated on antivirals.   Not clear if patient actually intiated antivirals, but not taking them at present.    Can repeat LFTs in a couple of days, trend is not acutely concerning at present.  Should have outpatient follow-up with hepatology.

## 2022-12-02 NOTE — BH PSYCHOLOGY - GROUP THERAPY NOTE - NSPSYCHOLGRPDBTTOL_PSY_A_CORE FT
Patient attended the DBT Skills Acquisition group today, which takes place daily and is invite only. Today's topic was Distress Tolerance and focused on When to Use Crisis Survival Skills, STOP skill, and Pros and Cons of engaging in crisis urges. Group started with a brief mindfulness exercise and check-in. Next, group discussed the definition of a crisis, when to use crisis survival skills, how to use the STOP skill to refrain from acting impulsively, and how to identify pros and cons of engaging crisis behavior. Group members took turns reading aloud, engaged in discussion about crisis survival, and identified situations in their own life they will utilize the STOP skill.  encouraged participants to practice the skills using the practice worksheet outside of group. Participants were engaged and interacted well with one another. 
Patient attended the DBT Skills Acquisition group today, which takes place daily and is invite only. Today's topic was TIPP Skills and Paired Relaxation, which are under the Distress Tolerance module of DBT. Group focused on the TIPP skill and step-by-step instructions for paired relaxation in order to change group members' body chemistry to reduce emotional reactivity quickly and to practice relaxing each of their muscles, respectively. Group members took turns reading aloud and engaged in discussion about TIPP skills, paired relaxation, and their experiences of distress.  engaged members in a progressive muscle relaxation to practice the skill.  also encouraged group members to practice the skills and to practice by using worksheets outside of group. Participants were engaged and interacted well with one another.

## 2022-12-02 NOTE — BH PSYCHOLOGY - GROUP THERAPY NOTE - NSPSYCHOLGRPDBTGOAL_PSY_A_CORE
reduce mood and affective lability/reduce impulsive self-defeating behavior/reduce interpersonal conflicts/improve ability to indentify feelings/improve ability to communicate feelings/improve ability re: assertive/set limits/reduce vulnerability to emotional dysregualation/promote skills to reduce anger
other...

## 2022-12-02 NOTE — BH PSYCHOLOGY - GROUP THERAPY NOTE - NSPSYCHOLGRPDBTPROB_PSY_A_CORE
other...
anger/anxiety/depressed mood/emotional dysregulation/impulsive behaviors/irritability/labile affect/poor judgment

## 2022-12-03 RX ADMIN — Medication 1 PATCH: at 09:47

## 2022-12-03 RX ADMIN — Medication 50 MILLIGRAM(S): at 21:16

## 2022-12-03 RX ADMIN — METHADONE HYDROCHLORIDE 20 MILLIGRAM(S): 40 TABLET ORAL at 09:47

## 2022-12-03 RX ADMIN — Medication 1 MILLIGRAM(S): at 21:16

## 2022-12-03 RX ADMIN — ESCITALOPRAM OXALATE 10 MILLIGRAM(S): 10 TABLET, FILM COATED ORAL at 09:47

## 2022-12-03 RX ADMIN — METHADONE HYDROCHLORIDE 80 MILLIGRAM(S): 40 TABLET ORAL at 09:47

## 2022-12-03 RX ADMIN — Medication 10 MILLIGRAM(S): at 20:45

## 2022-12-04 RX ORDER — ESCITALOPRAM OXALATE 10 MG/1
1 TABLET, FILM COATED ORAL
Qty: 14 | Refills: 0
Start: 2022-12-04 | End: 2022-12-17

## 2022-12-04 RX ORDER — METHADONE HYDROCHLORIDE 40 MG/1
2 TABLET ORAL
Qty: 0 | Refills: 0 | DISCHARGE
Start: 2022-12-04

## 2022-12-04 RX ORDER — ESCITALOPRAM OXALATE 10 MG/1
1 TABLET, FILM COATED ORAL
Qty: 0 | Refills: 0 | DISCHARGE
Start: 2022-12-04

## 2022-12-04 RX ORDER — METHADONE HYDROCHLORIDE 40 MG/1
0 TABLET ORAL
Qty: 0 | Refills: 0 | DISCHARGE

## 2022-12-04 RX ORDER — BUPRENORPHINE AND NALOXONE 2; .5 MG/1; MG/1
0 TABLET SUBLINGUAL
Qty: 0 | Refills: 0 | DISCHARGE

## 2022-12-04 RX ADMIN — Medication 1 MILLIGRAM(S): at 17:18

## 2022-12-04 RX ADMIN — Medication 10 MILLIGRAM(S): at 23:48

## 2022-12-04 RX ADMIN — Medication 50 MILLIGRAM(S): at 23:48

## 2022-12-04 RX ADMIN — ESCITALOPRAM OXALATE 10 MILLIGRAM(S): 10 TABLET, FILM COATED ORAL at 10:22

## 2022-12-04 RX ADMIN — METHADONE HYDROCHLORIDE 20 MILLIGRAM(S): 40 TABLET ORAL at 10:22

## 2022-12-04 RX ADMIN — METHADONE HYDROCHLORIDE 80 MILLIGRAM(S): 40 TABLET ORAL at 10:23

## 2022-12-04 RX ADMIN — Medication 1 PATCH: at 10:31

## 2022-12-05 VITALS — TEMPERATURE: 97 F | RESPIRATION RATE: 19 BRPM

## 2022-12-05 LAB
ALBUMIN SERPL ELPH-MCNC: 4 G/DL — SIGNIFICANT CHANGE UP (ref 3.3–5)
ALP SERPL-CCNC: 81 U/L — SIGNIFICANT CHANGE UP (ref 40–120)
ALT FLD-CCNC: 68 U/L — HIGH (ref 4–33)
AST SERPL-CCNC: 87 U/L — HIGH (ref 4–32)
BILIRUB DIRECT SERPL-MCNC: <0.2 MG/DL — SIGNIFICANT CHANGE UP (ref 0–0.3)
BILIRUB INDIRECT FLD-MCNC: >0.2 MG/DL — SIGNIFICANT CHANGE UP (ref 0–1)
BILIRUB SERPL-MCNC: 0.4 MG/DL — SIGNIFICANT CHANGE UP (ref 0.2–1.2)
PROT SERPL-MCNC: 8.1 G/DL — SIGNIFICANT CHANGE UP (ref 6–8.3)

## 2022-12-05 PROCEDURE — 99231 SBSQ HOSP IP/OBS SF/LOW 25: CPT

## 2022-12-05 RX ADMIN — ESCITALOPRAM OXALATE 10 MILLIGRAM(S): 10 TABLET, FILM COATED ORAL at 09:19

## 2022-12-05 RX ADMIN — Medication 1 MILLIGRAM(S): at 12:48

## 2022-12-05 RX ADMIN — METHADONE HYDROCHLORIDE 80 MILLIGRAM(S): 40 TABLET ORAL at 09:19

## 2022-12-05 RX ADMIN — METHADONE HYDROCHLORIDE 20 MILLIGRAM(S): 40 TABLET ORAL at 09:20

## 2022-12-05 RX ADMIN — Medication 1 PATCH: at 09:20

## 2022-12-05 NOTE — BH INPATIENT PSYCHIATRY DISCHARGE NOTE - NSDCMRMEDTOKEN_GEN_ALL_CORE_FT
escitalopram 10 mg oral tablet: 1 tab(s) orally once a day  methadone 10 mg oral tablet: 2 tab(s) orally once a day  methadone 40 mg oral tablet, dispersible: 2 tab(s) orally once a day

## 2022-12-05 NOTE — BH DISCHARGE NOTE NURSING/SOCIAL WORK/PSYCH REHAB - NSDCPEEMAIL_GEN_ALL_CORE
[employer] for Tobacco Control email [email or web address]

## 2022-12-05 NOTE — BH INPATIENT PSYCHIATRY PROGRESS NOTE - NSICDXBHPRIMARYDX_PSY_ALL_CORE
Depression   F32.A  

## 2022-12-05 NOTE — BH PSYCHOLOGY - CLINICIAN PSYCHOTHERAPY NOTE - NSBHPSYCHOLNARRATIVE_PSY_A_CORE FT
Writer met with pt for individual psychotherapy. Pt reported her mood as anxious. Affect appeared somewhat anxious and pt became tearful throughout the session. Speech was a normal rate and volume. Pt denied SIIP. No delusions/AH/Vh observed or reported at this time.     Pt reported she continues to feel anxious related to challenges associated with being on an inpatient unit. However, she reported her anxiety has lessened since her admission. Reviewed pt's coping skills, she identified that she has been practicing breathing, counting, mindfulness, and balancing acceptance and change. Pt discussed her recent relapse and suicidal thoughts prior to admission. She stated she does not want to hurt herself or others by continuing to use substances or by harming herself and feels "selfish" for having thought about suicide. Pt discussed feelings that no one thinks about or cares about her, but can recognize the evidence that this is not true. Explored pt's social supports including family members and her boyfriend. Pt reported after her discharge she wants to engage in more activities, get out of the house more, and have more structure. Brainstormed ways to create routine and activity.
Writer met with pt for initial individual psychotherapy session. Pt reported mood as anxious and depressed though stated her mood has improved since her admissions. Affect appeared euthymic. Speech was a normal rate and volume. Pt denied current SIIP. No delusions/AH/VH observed or reported at this time.    Writer oriented pt to structure of psychotherapy services on the unit. She reported that she feels the groups and interactions with her peers have helped her a lot, in particular learning to manage her emotions. She reported she continues to feel anxious and depressed though she feels her symptoms have improved since admission. Discussed circumstances leading to current admission. Pt reported she had a "nervous breakdown" due to feeling overwhelmed by stressors and had "thoughts of suicide" specifically "I'm done" "I can't do this anymore" "I don't want to live" and had thoughts of jumping off of a high building such as the platform at her local train station. Pt reported she told her boyfriend about these thoughts and he brought her to the hospital. She denies current SIIP at this time and stated "I don't see it as a good enough solution," she realizes how it would negatively impact her boyfriend, and because she would want her mom to be proud. She described feeling more hopeful about her ability to manage stressors. Pt reported 1 past SA a few years ago in which she attempted to OD on heroine. In addition, she reported her mother  by OD and the pt feels she had a part in her mother's OD. Discussed pt's goals for admission. Pt is motivated to get a job when she is discharged and wants to engage in activities throughout her day, as opposed to sleeping all day as she had done prior. 
Writer met with pt for final individual psychotherapy session. Pt reported her mood as "good" affect appeared euthymic. Speech was a normal rate and volume. No delusions/AH/VH or SIIP observed or reported at this time.     Pt reported feeling good related to her upcoming discharge. Pt expressed concerns and questions regarding her discharge plan, writer addressed concerns and referred pt to her provider and SW when appropriate. Pt described feeling motivated to attend PHP and learn new skills through the groups. Completed safety plan. Pt identified that the holidays were a trigger for current admission due to loss of her mother. She stated talking with others is helpful and if she were to experience SI or other difficulties she would inform her social supports and treatment team. Pt was readily able to identify coping skills and protective factors.

## 2022-12-05 NOTE — BH INPATIENT PSYCHIATRY PROGRESS NOTE - NSCGIIMPROVESX_PSY_ALL_CORE
3 = Minimally improved - slightly better with little or no clinically meaningful reduction of symptoms.  Represents very little change in basic clinical status, level of care, or functional capacity.
3 = Minimally improved - slightly better with little or no clinically meaningful reduction of symptoms.  Represents very little change in basic clinical status, level of care, or functional capacity.
2 = Much improved - notably better with signficant reduction of symptoms; increase in the level of functioning but some symptoms remain
4 = No change - symptoms remain essentially unchanged
2 = Much improved - notably better with signficant reduction of symptoms; increase in the level of functioning but some symptoms remain

## 2022-12-05 NOTE — BH INPATIENT PSYCHIATRY DISCHARGE NOTE - NSBHMETABOLIC_PSY_ALL_CORE_FT
BMI: BMI (kg/m2): 19.2 (11-24-22 @ 03:10)  HbA1c: A1C with Estimated Average Glucose Result: 4.8 % (11-28-22 @ 09:17)    Glucose:   BP: --  Lipid Panel: Date/Time: 11-28-22 @ 09:17  Cholesterol, Serum: 209  Direct LDL: --  HDL Cholesterol, Serum: 65  Total Cholesterol/HDL Ration Measurement: --  Triglycerides, Serum: 101

## 2022-12-05 NOTE — BH PSYCHOLOGY - CLINICIAN PSYCHOTHERAPY NOTE - TOKEN PULL-DIAGNOSIS
Primary Diagnosis:  Depression [F32.A]        Problem Dx:   Cocaine dependence without complication [F14.20]      Uncomplicated opioid dependence [F11.20]      Generalized anxiety disorder [F41.1]      Severe episode of recurrent major depressive disorder, without psychotic features [F33.2]      
Primary Diagnosis:  Depression [F32.A]        Problem Dx:   Cocaine dependence without complication [F14.20]      Uncomplicated opioid dependence [F11.20]      Generalized anxiety disorder [F41.1]      Severe episode of recurrent major depressive disorder, without psychotic features [F33.2]      
Primary Diagnosis:  Depression [F32.A]        Problem Dx:   Cocaine dependence without complication [F14.20]      Uncomplicated opioid dependence [F11.20]      Generalized anxiety disorder [F41.1]

## 2022-12-05 NOTE — BH INPATIENT PSYCHIATRY PROGRESS NOTE - PRN MEDS
MEDICATIONS  (PRN):  acetaminophen     Tablet .. 650 milliGRAM(s) Oral every 6 hours PRN Moderate Pain (4 - 6)  aluminum hydroxide/magnesium hydroxide/simethicone Suspension 30 milliLiter(s) Oral every 4 hours PRN Dyspepsia  diphenhydrAMINE 25 milliGRAM(s) Oral every 4 hours PRN Rash and/or Itching  haloperidol     Tablet 1 milliGRAM(s) Oral every 8 hours PRN severe agitation  haloperidol    Injectable 2 milliGRAM(s) IntraMuscular every 12 hours PRN Agitation  hydrOXYzine hydrochloride 25 milliGRAM(s) Oral every 6 hours PRN anxiety/insomnia  LORazepam     Tablet 1 milliGRAM(s) Oral every 12 hours PRN Anxiety  melatonin. 3 milliGRAM(s) Oral at bedtime PRN Insomnia  polyethylene glycol 3350 17 Gram(s) Oral daily PRN constipation  traZODone 50 milliGRAM(s) Oral at bedtime PRN insomnia  
MEDICATIONS  (PRN):  acetaminophen     Tablet .. 650 milliGRAM(s) Oral every 6 hours PRN Moderate Pain (4 - 6)  aluminum hydroxide/magnesium hydroxide/simethicone Suspension 30 milliLiter(s) Oral every 4 hours PRN Dyspepsia  diphenhydrAMINE 25 milliGRAM(s) Oral every 4 hours PRN Rash and/or Itching  haloperidol     Tablet 1 milliGRAM(s) Oral every 8 hours PRN severe agitation  haloperidol    Injectable 2 milliGRAM(s) IntraMuscular every 12 hours PRN Agitation  hydrOXYzine hydrochloride 25 milliGRAM(s) Oral every 6 hours PRN anxiety/insomnia  LORazepam     Tablet 1 milliGRAM(s) Oral every 12 hours PRN Anxiety  melatonin. 3 milliGRAM(s) Oral at bedtime PRN Insomnia  polyethylene glycol 3350 17 Gram(s) Oral daily PRN constipation  traZODone 25 milliGRAM(s) Oral at bedtime PRN insomnia  
MEDICATIONS  (PRN):  acetaminophen     Tablet .. 650 milliGRAM(s) Oral every 6 hours PRN Moderate Pain (4 - 6)  aluminum hydroxide/magnesium hydroxide/simethicone Suspension 30 milliLiter(s) Oral every 4 hours PRN Dyspepsia  diphenhydrAMINE 25 milliGRAM(s) Oral every 4 hours PRN Rash and/or Itching  haloperidol     Tablet 1 milliGRAM(s) Oral every 8 hours PRN severe agitation  haloperidol    Injectable 2 milliGRAM(s) IntraMuscular every 12 hours PRN Agitation  hydrOXYzine hydrochloride 25 milliGRAM(s) Oral every 6 hours PRN anxiety/insomnia  LORazepam     Tablet 1 milliGRAM(s) Oral every 12 hours PRN Anxiety  melatonin. 3 milliGRAM(s) Oral at bedtime PRN Insomnia  polyethylene glycol 3350 17 Gram(s) Oral daily PRN constipation  traZODone 50 milliGRAM(s) Oral at bedtime PRN insomnia  

## 2022-12-05 NOTE — BH SAFETY PLAN - THE ONE THING THAT IS MOST IMPORTANT TO ME AND WORTH LIVING FOR IS:
Myself, [place], my future, becoming more self-reliant, knowing I would be missed If I weren't around anymore

## 2022-12-05 NOTE — BH DISCHARGE NOTE NURSING/SOCIAL WORK/PSYCH REHAB - NSCDUDCCRISIS_PSY_A_CORE
[facility] Well  1 [phone]) [facility]-WELL ([phone])  Text "WELL" to 17796  Website: www.Gen9/.National Suicide Prevention Lifeline 1 (210) 040-9865/.  [facility]  1 (874) LIFEN[facility] (450-1764)/.  [facility]  [phone] [place] [address] [id number] [phone]   Hours:  Monday through Friday from 9 AM to 3 PM/.  [facility]  [phone] [place] [address] [phone]   Hours: Monday through Friday from 10 AM to 4 PM/988 Suicide and Crisis Lifeline

## 2022-12-05 NOTE — BH INPATIENT PSYCHIATRY PROGRESS NOTE - NSBHFUPINTERVALCCFT_PSY_A_CORE
" Im doing ok"
" Im doing better with my depression"
" I have been overwhelmed with thoughts of suicide"
" Im doing a little better"
" Im still a little anxious"
" Im very stressed"
" im doing good"

## 2022-12-05 NOTE — BH INPATIENT PSYCHIATRY DISCHARGE NOTE - OTHER PAST PSYCHIATRIC HISTORY (INCLUDE DETAILS REGARDING ONSET, COURSE OF ILLNESS, INPATIENT/OUTPATIENT TREATMENT)
Pt is a 27 yr old single woman with a history of depression, anxiety and substance misuse, currently in treatment at [facility] and with Dr. Schafer in [place]. 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 She has no previous admissions but one prior OD on heroin 8 years ago which she identifies as a suicide attempt (did not tell anyone nor seek help afterwards). Currently she reports worsening suicidal ideation and inability to function. She cries every day, has not been showering, feels hopeless and does not want to live anymore. Denies active ideation or plan to harm self.

## 2022-12-05 NOTE — BH DISCHARGE NOTE NURSING/SOCIAL WORK/PSYCH REHAB - PATIENT PORTAL LINK FT
You can access the FollowMyHealth Patient Portal offered by [facility] by registering at the following website: [email or web address]. By joining [facility]’s FollowMyHealth portal, you will also be able to view your health information using other applications (apps) compatible with our system.

## 2022-12-05 NOTE — BH INPATIENT PSYCHIATRY PROGRESS NOTE - MSE UNSTRUCTURED FT
Appearance: pt dressed in casual attire fair hygiene  Behavior: calm and  cooperative  Motor: no agitation noted  Speech: normal in rate, rhythm and volume  Mood: "Im doing good"  Affect: euthymic  Thought Process: Linear.  Associations: Fair.  Thought Content: passive SI/ denies HI  Insight: Fair.   Judgment: Fair  Attention: fair  Language: Fluent.  Gait: steady gait

## 2022-12-05 NOTE — BH PSYCHOLOGY - CLINICIAN PSYCHOTHERAPY NOTE - NSBHPSYCHOLINT_PSY_A_CORE
Encourage medication compliance/Problem-solving techniques discussed/Referred to physician/Supported coping skills/Supportive therapy/Treatment compliance encouraged
Stress management/Supported coping skills/Supportive therapy/Treatment compliance encouraged
Cognitive/behavioral therapy/Dialectical  Behavioral Therapy (DBT)/Encourage medication compliance/Problem-solving techniques discussed/Referred to physician/Stress management/Supported coping skills/Supportive therapy/Treatment compliance encouraged

## 2022-12-05 NOTE — BH INPATIENT PSYCHIATRY PROGRESS NOTE - CURRENT MEDICATION
MEDICATIONS  (STANDING):  escitalopram 5 milliGRAM(s) Oral once  escitalopram 10 milliGRAM(s) Oral daily  melatonin 10 milliGRAM(s) Oral at bedtime  methadone    Tablet 20 milliGRAM(s) Oral daily  methadone   Dispersible Tablet 80 milliGRAM(s) Oral daily  nicotine - 21 mG/24Hr(s) Patch 1 Patch Transdermal daily    MEDICATIONS  (PRN):  acetaminophen     Tablet .. 650 milliGRAM(s) Oral every 6 hours PRN Moderate Pain (4 - 6)  aluminum hydroxide/magnesium hydroxide/simethicone Suspension 30 milliLiter(s) Oral every 4 hours PRN Dyspepsia  diphenhydrAMINE 25 milliGRAM(s) Oral every 4 hours PRN Rash and/or Itching  haloperidol     Tablet 1 milliGRAM(s) Oral every 8 hours PRN severe agitation  haloperidol    Injectable 2 milliGRAM(s) IntraMuscular every 12 hours PRN Agitation  hydrOXYzine hydrochloride 25 milliGRAM(s) Oral every 6 hours PRN anxiety/insomnia  LORazepam     Tablet 1 milliGRAM(s) Oral every 12 hours PRN Anxiety  melatonin. 3 milliGRAM(s) Oral at bedtime PRN Insomnia  polyethylene glycol 3350 17 Gram(s) Oral daily PRN constipation  traZODone 50 milliGRAM(s) Oral at bedtime PRN insomnia  
MEDICATIONS  (STANDING):  escitalopram 5 milliGRAM(s) Oral once  melatonin 10 milliGRAM(s) Oral at bedtime  methadone    Tablet 20 milliGRAM(s) Oral daily  methadone   Dispersible Tablet 80 milliGRAM(s) Oral daily  nicotine - 21 mG/24Hr(s) Patch 1 Patch Transdermal daily    MEDICATIONS  (PRN):  acetaminophen     Tablet .. 650 milliGRAM(s) Oral every 6 hours PRN Moderate Pain (4 - 6)  aluminum hydroxide/magnesium hydroxide/simethicone Suspension 30 milliLiter(s) Oral every 4 hours PRN Dyspepsia  diphenhydrAMINE 25 milliGRAM(s) Oral every 4 hours PRN Rash and/or Itching  haloperidol     Tablet 1 milliGRAM(s) Oral every 8 hours PRN severe agitation  haloperidol    Injectable 2 milliGRAM(s) IntraMuscular every 12 hours PRN Agitation  hydrOXYzine hydrochloride 25 milliGRAM(s) Oral every 6 hours PRN anxiety/insomnia  LORazepam     Tablet 1 milliGRAM(s) Oral every 12 hours PRN Anxiety  melatonin. 3 milliGRAM(s) Oral at bedtime PRN Insomnia  polyethylene glycol 3350 17 Gram(s) Oral daily PRN constipation  traZODone 25 milliGRAM(s) Oral at bedtime PRN insomnia  
MEDICATIONS  (STANDING):  escitalopram 5 milliGRAM(s) Oral once  escitalopram 10 milliGRAM(s) Oral daily  melatonin 10 milliGRAM(s) Oral at bedtime  methadone    Tablet 20 milliGRAM(s) Oral daily  methadone   Dispersible Tablet 80 milliGRAM(s) Oral daily  nicotine - 21 mG/24Hr(s) Patch 1 Patch Transdermal daily    MEDICATIONS  (PRN):  acetaminophen     Tablet .. 650 milliGRAM(s) Oral every 6 hours PRN Moderate Pain (4 - 6)  aluminum hydroxide/magnesium hydroxide/simethicone Suspension 30 milliLiter(s) Oral every 4 hours PRN Dyspepsia  diphenhydrAMINE 25 milliGRAM(s) Oral every 4 hours PRN Rash and/or Itching  haloperidol     Tablet 1 milliGRAM(s) Oral every 8 hours PRN severe agitation  haloperidol    Injectable 2 milliGRAM(s) IntraMuscular every 12 hours PRN Agitation  hydrOXYzine hydrochloride 25 milliGRAM(s) Oral every 6 hours PRN anxiety/insomnia  LORazepam     Tablet 1 milliGRAM(s) Oral every 12 hours PRN Anxiety  melatonin. 3 milliGRAM(s) Oral at bedtime PRN Insomnia  polyethylene glycol 3350 17 Gram(s) Oral daily PRN constipation  traZODone 50 milliGRAM(s) Oral at bedtime PRN insomnia  
MEDICATIONS  (STANDING):  escitalopram 5 milliGRAM(s) Oral once  melatonin 10 milliGRAM(s) Oral at bedtime  methadone    Tablet 20 milliGRAM(s) Oral daily  methadone   Dispersible Tablet 80 milliGRAM(s) Oral daily  nicotine - 21 mG/24Hr(s) Patch 1 Patch Transdermal daily    MEDICATIONS  (PRN):  acetaminophen     Tablet .. 650 milliGRAM(s) Oral every 6 hours PRN Moderate Pain (4 - 6)  aluminum hydroxide/magnesium hydroxide/simethicone Suspension 30 milliLiter(s) Oral every 4 hours PRN Dyspepsia  diphenhydrAMINE 25 milliGRAM(s) Oral every 4 hours PRN Rash and/or Itching  haloperidol     Tablet 1 milliGRAM(s) Oral every 8 hours PRN severe agitation  haloperidol    Injectable 2 milliGRAM(s) IntraMuscular every 12 hours PRN Agitation  hydrOXYzine hydrochloride 25 milliGRAM(s) Oral every 6 hours PRN anxiety/insomnia  LORazepam     Tablet 1 milliGRAM(s) Oral every 12 hours PRN Anxiety  melatonin. 3 milliGRAM(s) Oral at bedtime PRN Insomnia  polyethylene glycol 3350 17 Gram(s) Oral daily PRN constipation  traZODone 50 milliGRAM(s) Oral at bedtime PRN insomnia  
MEDICATIONS  (STANDING):  escitalopram 5 milliGRAM(s) Oral once  escitalopram 10 milliGRAM(s) Oral daily  melatonin 10 milliGRAM(s) Oral at bedtime  methadone    Tablet 20 milliGRAM(s) Oral daily  methadone   Dispersible Tablet 80 milliGRAM(s) Oral daily  nicotine - 21 mG/24Hr(s) Patch 1 Patch Transdermal daily    MEDICATIONS  (PRN):  acetaminophen     Tablet .. 650 milliGRAM(s) Oral every 6 hours PRN Moderate Pain (4 - 6)  aluminum hydroxide/magnesium hydroxide/simethicone Suspension 30 milliLiter(s) Oral every 4 hours PRN Dyspepsia  diphenhydrAMINE 25 milliGRAM(s) Oral every 4 hours PRN Rash and/or Itching  haloperidol     Tablet 1 milliGRAM(s) Oral every 8 hours PRN severe agitation  haloperidol    Injectable 2 milliGRAM(s) IntraMuscular every 12 hours PRN Agitation  hydrOXYzine hydrochloride 25 milliGRAM(s) Oral every 6 hours PRN anxiety/insomnia  LORazepam     Tablet 1 milliGRAM(s) Oral every 12 hours PRN Anxiety  melatonin. 3 milliGRAM(s) Oral at bedtime PRN Insomnia  polyethylene glycol 3350 17 Gram(s) Oral daily PRN constipation  traZODone 50 milliGRAM(s) Oral at bedtime PRN insomnia

## 2022-12-05 NOTE — BH INPATIENT PSYCHIATRY PROGRESS NOTE - NSBHATTESTTYPEVISIT_PSY_A_CORE
On-site Attending supervising ELADIA (99XXX codes)

## 2022-12-05 NOTE — BH INPATIENT PSYCHIATRY DISCHARGE NOTE - NSDCCPCAREPLAN_GEN_ALL_CORE_FT
PRINCIPAL DISCHARGE DIAGNOSIS  Diagnosis: Severe recurrent major depression without psychotic features  Assessment and Plan of Treatment:

## 2022-12-05 NOTE — BH INPATIENT PSYCHIATRY PROGRESS NOTE - NSBHASSESSSUMMFT_PSY_ALL_CORE
The patient is 27 years old single, unemployed female, non-caregiver, who resides wit her "older friend" ; PMH of Hep C, PPH of depression, anxiety, no psych admissions, but one SA buy OD on drugs "8 years ago , I did not tell anyone" as per the pt,  she has history of opioid/cocaine dependence, currently in treatment in Methadone maintenance program at [facility], under care of Dr Schafer. She denies any Hx of violence, no legal issues. Patient was BIB her friend for worsening of depression and SI.    upon interview patient appears calm and  cooperative during interview. pt engaged in groups and participates.  pt compliant with medication.  pt denies suicidal and homicidal ideation.   patient denies auditory and visual hallucinations.  discussed with patient to take medications every  day and to not skip a dose.  discuss with patient if symptoms worsen to contact provider/ call 911.  discussed with patient to follow up with   PMD regarding blood work. patient verbalized understanding.        discharge patient with fourteen day supply of medication.   escitalopram 10milligram daily    Patient f/u with Dr. Orozco  [facility] at [facility]

## 2022-12-05 NOTE — BH INPATIENT PSYCHIATRY DISCHARGE NOTE - HPI (INCLUDE ILLNESS QUALITY, SEVERITY, DURATION, TIMING, CONTEXT, MODIFYING FACTORS, ASSOCIATED SIGNS AND SYMPTOMS)
As per ER assessment:  "The patient is 27 years old single, unemployed female, non-caregiver, who resides wit her "older friend" ; PMH of Hep C, PPH of depression, anxiety, no psych admissions, but one SA buy OD on drugs "8 years ago , I did not tell anyone" as per the pt,  she has history of opioid/cocaine dependence, currently in treatment in Methadone maintenance program at [facility], under care of Dr Schafer. She denies any Hx of violence, no legal issues. Patient was BIB her friend for worsening of depression and SI. During the evaluation patient is tearful and anxious. She reports that she has been feeling depressed for "a while" but last several days she is not able to function. She states that she feels helpless and hopeless, does not want to do anything, she "even" stopped taking showers. She states that sometimes she sleeps well and sometimes she can't sleep. She feels anxious, depressed, cries a lot , can't concentrate and does not want to live anymore.  No plan. She feels hopeless, she states that her friend tried to convince her that "I still can fix it;  but I don't think it's possible, it is too late, I have seen enough cruelty, I am tired; I don't want to be here anymore". Patient states that she has poor support; she does not get along well with her maternal aunt and brother; she has financially supportive "older friend, but he is nasty; he pays for everything; but he is nasty" . She feels like she is "nothing, because I don't work, and I could not even get my GED". Patient also admits to excessive guilt, she believes that she was the reason that her mother started using drugs and OD herself and , although she knows that her mother's BF was a drug dealer and she had easy access to drugs. Patient denies any psychotic symptoms, she denies hearing voices, no visions or delusions, No IOR, paranoia, thought insertion or thought broadcasting. She denies any manic symptoms, no diminished need for sleep or goal directed activity, no spending spree. She reports that she relapsed 2 weeks ago on cocaine and crack, feels guilty about it."    On interview, pt states she has not felt well in over a year, has been struggling with depression and anxiety in context of psychosocial stressors - not having education or employment, stressful family issues, and now possibly facing eviction.  Pt states she has been very depressed and hopeless.  States she has suicidal thoughts, recently had plan to jump off train platform next to her home.  States she still thinks about suicide but would not do anything in hospital.  Pt acknowledges having recent relapse on cocaine 2 weeks ago, but denies any other recreational drug use other than that one instance.  Pt states she takes methadone and clonazepam as an outpatient.  She follows with [facility].  Pt states she has tried antidepressants in the past and does not wish to try them again.

## 2022-12-05 NOTE — BH PSYCHOLOGY - CLINICIAN PSYCHOTHERAPY NOTE - NSBHPSYCHOLGOALS_PSY_A_CORE
Assessment/Improve level of independent functioning/Improve social/vocational/coping skills/Prevent relapse/Psychoeducation/Treatment compliance
Decrease symptoms/Assessment/Treatment compliance
Decrease symptoms/Improve level of independent functioning/Improve social/vocational/coping skills/Psychoeducation/Treatment compliance

## 2022-12-05 NOTE — BH INPATIENT PSYCHIATRY DISCHARGE NOTE - ATTENDING DISCHARGE PHYSICAL EXAMINATION:
On day of discharge, the patient has improved significantly and no longer requires inpatient treatment and care. Patient denies all suicidal and aggressive ideation, intent and plan. Patient displays no psychotic symptoms. Patient is not judged to be an acute danger to self or others at this time.

## 2022-12-05 NOTE — BH DISCHARGE NOTE NURSING/SOCIAL WORK/PSYCH REHAB - NSDCPRGOAL_PSY_ALL_CORE
Writer met with patient to discuss patient’s progress throughout her inpatient stay. Upon admission, patient presented with worsening depression and was observed as tearful and tense. During her inpatient stay, patient was visible on the unit, participated in some groups, and established positive peer relationships. In groups, patient was engaged, insightful, and motivated to complete the group activity. At discharge, patient presents as calm and reported plan to pursue part-time work as a . Patient appeared future-oriented as she discussed feeling motivated for continued group work and individual counseling. Patient met her psychiatric rehabilitation goal evidenced by her ability to identify DBT skills (i.e. mindfulness), listening to music, and creative art activities as effective coping skills. Patient exhibits medication compliance, good ADLs, and good behavioral control. Patient denied SI.

## 2022-12-05 NOTE — BH INPATIENT PSYCHIATRY PROGRESS NOTE - NSBHATTESTBILLONSITE_PSY_A_CORE
ELADIA to bill

## 2022-12-05 NOTE — BH INPATIENT PSYCHIATRY PROGRESS NOTE - NSBHCHARTREVIEWVS_PSY_A_CORE FT
Vital Signs Last 24 Hrs  T(C): 36.9 (11-28-22 @ 17:22), Max: 36.9 (11-28-22 @ 17:22)  T(F): 98.5 (11-28-22 @ 17:22), Max: 98.5 (11-28-22 @ 17:22)  HR: --  BP: --  BP(mean): --  RR: --  SpO2: --    Orthostatic VS  11-28-22 @ 08:21  Lying BP: --/-- HR: --  Sitting BP: 102/64 HR: 79  Standing BP: --/-- HR: --  Site: --  Mode: --  
Vital Signs Last 24 Hrs  T(C): 36.8 (11-25-22 @ 07:50), Max: 36.8 (11-25-22 @ 07:50)  T(F): 98.2 (11-25-22 @ 07:50), Max: 98.2 (11-25-22 @ 07:50)  HR: --  BP: --  BP(mean): --  RR: 18 (11-25-22 @ 07:50) (18 - 18)  SpO2: --    Orthostatic VS  11-25-22 @ 07:50  Lying BP: --/-- HR: --  Sitting BP: 122/72 HR: 69  Standing BP: 118/76 HR: 66  Site: --  Mode: --  Orthostatic VS  11-24-22 @ 07:57  Lying BP: --/-- HR: --  Sitting BP: 104/64 HR: 69  Standing BP: 100/60 HR: 72  Site: --  Mode: --  Orthostatic VS  11-24-22 @ 03:10  Lying BP: --/-- HR: --  Sitting BP: 111/73 HR: 55  Standing BP: 112/70 HR: 58  Site: --  Mode: --  
Vital Signs Last 24 Hrs  T(C): 36.4 (12-02-22 @ 08:41), Max: 37 (12-01-22 @ 17:41)  T(F): 97.6 (12-02-22 @ 08:41), Max: 98.6 (12-01-22 @ 17:41)  HR: --  BP: --  BP(mean): --  RR: --  SpO2: --    Orthostatic VS  12-02-22 @ 08:41  Lying BP: --/-- HR: --  Sitting BP: 107/65 HR: 86  Standing BP: 98/61 HR: 93  Site: --  Mode: --  Orthostatic VS  12-01-22 @ 09:31  Lying BP: --/-- HR: --  Sitting BP: 105/74 HR: 90  Standing BP: --/-- HR: --  Site: --  Mode: --  
Vital Signs Last 24 Hrs  T(C): 36.3 (12-05-22 @ 08:14), Max: 36.3 (12-05-22 @ 08:14)  T(F): 97.4 (12-05-22 @ 08:14), Max: 97.4 (12-05-22 @ 08:14)  HR: --  BP: --  BP(mean): --  RR: 19 (12-05-22 @ 08:14) (19 - 19)  SpO2: --    Orthostatic VS  12-05-22 @ 08:14  Lying BP: --/-- HR: --  Sitting BP: 103/60 HR: 101  Standing BP: 98/62 HR: 95  Site: --  Mode: --  Orthostatic VS  12-04-22 @ 09:41  Lying BP: --/-- HR: --  Sitting BP: 104/70 HR: 89  Standing BP: 107/53 HR: 104  Site: --  Mode: --  
Vital Signs Last 24 Hrs  T(C): 37.6 (11-29-22 @ 17:16), Max: 37.6 (11-29-22 @ 17:16)  T(F): 99.6 (11-29-22 @ 17:16), Max: 99.6 (11-29-22 @ 17:16)  HR: --  BP: --  BP(mean): --  RR: --  SpO2: --    
Vital Signs Last 24 Hrs  T(C): 37 (12-01-22 @ 17:41), Max: 37.2 (11-30-22 @ 18:24)  T(F): 98.6 (12-01-22 @ 17:41), Max: 99 (11-30-22 @ 18:24)  HR: --  BP: --  BP(mean): --  RR: --  SpO2: --    Orthostatic VS  12-01-22 @ 09:31  Lying BP: --/-- HR: --  Sitting BP: 105/74 HR: 90  Standing BP: --/-- HR: --  Site: --  Mode: --  
Vital Signs Last 24 Hrs  T(C): 37.4 (11-28-22 @ 08:21), Max: 37.4 (11-28-22 @ 08:21)  T(F): 99.4 (11-28-22 @ 08:21), Max: 99.4 (11-28-22 @ 08:21)  HR: --  BP: --  BP(mean): --  RR: 18 (11-28-22 @ 08:21) (18 - 18)  SpO2: --    Orthostatic VS  11-28-22 @ 08:21  Lying BP: --/-- HR: --  Sitting BP: 102/64 HR: 79  Standing BP: --/-- HR: --  Site: --  Mode: --

## 2022-12-05 NOTE — BH INPATIENT PSYCHIATRY PROGRESS NOTE - NSDCCRITERIA_PSY_ALL_CORE
When pt is no longer an acute or imminent risk of harm to self or others, and is able to care for self safely, pt may then be discharged. 

## 2022-12-05 NOTE — BH INPATIENT PSYCHIATRY PROGRESS NOTE - NSBHFUPINTERVALHXFT_PSY_A_CORE
pt seen for f/u for depression and SI.  VSS chart reviewed and case discussed with treatment team.  pt is compliant with medications SE.  pt denies  auditory and visual hallucination.  pt denies suicidal and homicidal ideation.  pt  sleeping and eating well.  discussed with her to take medications  every day and to not skip a dose.  discuss with patient if symptoms worsen to call provider/ call 911.  Patient verbalized understanding.  Patient is looking forward to seeing her dog,  getting her life together, and eventually going to work.  Discussed with patient to follow up with PMD in one to two weeks regarding  blood work

## 2022-12-05 NOTE — BH PSYCHOLOGY - CLINICIAN PSYCHOTHERAPY NOTE - NSBHPSYCHOLRESPONSE_PSY_A_CORE
Symptoms reduced/Insight displayed/Accepted support
Symptoms reduced/Coping skills acquired/Insight displayed/Accepted support
Symptoms reduced/Coping skills acquired/Insight displayed/Accepted support

## 2022-12-05 NOTE — BH DISCHARGE NOTE NURSING/SOCIAL WORK/PSYCH REHAB - NSDCPEWEB_GEN_ALL_CORE
[facility] for Tobacco Control website --- [email or web address] website --- [email or web address]

## 2022-12-05 NOTE — BH INPATIENT PSYCHIATRY PROGRESS NOTE - NSCGISEVERILLNESS_PSY_ALL_CORE
2 = Borderline mentally ill – subtle or suspected pathology
4 = Moderately ill – overt symptoms causing noticeable, but modest, functional impairment or distress; symptom level may warrant medication

## 2022-12-05 NOTE — BH INPATIENT PSYCHIATRY DISCHARGE NOTE - HOSPITAL COURSE
Dates of Hospitalization: 11/23/22-12/5/22    The patient was admitted for worsening depression and suicidal thinking.  The patient  was feeling hopeless, anxious, decrease sleeping, decrease appetite,  with loss of pleasurable interests, decrease concentration and motivation.  The patient had not been on medication at home.  Discussed with patient to  start Lexapro.    Lexapro was increased to 10mg, which she had tolerated well.      The patient showed improvement in symptoms.  She reported improved mood.  She started to eat well, with improved appetite.  She report better sleep.  She denied suicidal ideation.  There were no behavioral problems on the unit.  Patient did not become agitated and did not require emergent intramuscular medications or seclusion/restraints.  Patient did not self-harm on the unit. Patient remained actively engaged in treatment. Patient participated in individual, group, and milieu therapy. Patient got along appropriately with staff and peers      On the day of discharge, the patient had continued to show improvement in symptoms.  She states her depression is much improved.  She denies any suicidal ideation, and behavior is well controlled.  The patient is at moderate  chronic risk for suicide/self harm/violence, but low acute risk for suicide/self harm/violence. Statistical  risk factors include; psychiatric illness dx Depression, history of 1 SA hx of substance,   Acute risk factors present on admission but mitigated during hospitalization include; suicidal ideation . Acute risk factors were mitigated during admission via Lexapro I/G/M therapy, safety planning, and psychoeducation. Protective factors include; good response to treatment, forward thinking regarding family, engaged in treatment work. Given protective factors, and that acute risk factors present on admission have been addressed and are no longer present at discharge, patient has returned to baseline level of acute risk and the patient no longer requires inpatient hospitalization for stabilization or safety. The patient is therefore appropriate for discharge to outpatient care. Chronic risk can be further mitigated by continued engagement with outpatient treatment.  At the time of discharge the patient engaged meaningfully in safety planning and was discharge home to outpatient treatment.          Discharge Diagnosis: depression      Discharge Medications  the patient was provided with a 14 day supply of psychiatric medications upon discharge    escitalopram 10 milligrams oral  daily

## 2022-12-05 NOTE — BH DISCHARGE NOTE NURSING/SOCIAL WORK/PSYCH REHAB - DISCHARGE INSTRUCTIONS AFTERCARE APPOINTMENTS
In order to check the location, date, or time of your aftercare appointment, please refer to your Discharge Instructions Document given to you upon leaving the hospital.  If you have lost the instructions please call [phone]

## 2022-12-05 NOTE — BH PSYCHOLOGY - CLINICIAN PSYCHOTHERAPY NOTE - NSBHPSYCHOLPROBS_PSY_ALL_CORE
Anxiety/Depression/Suicidality
Anxiety/Depression/Substance Abuse/Suicidality
Anxiety/Depression/Substance Abuse/Suicidality

## 2022-12-05 NOTE — BH INPATIENT PSYCHIATRY PROGRESS NOTE - NSBHATTESTAPPBILLTIME_PSY_A_CORE
I attest my time as ELADIA is greater than 50% of the total combined time spent on qualifying patient care activities. I have reviewed and verified the documentation.

## 2022-12-05 NOTE — BH INPATIENT PSYCHIATRY PROGRESS NOTE - NSBHMETABOLIC_PSY_ALL_CORE_FT
BMI: BMI (kg/m2): 19.2 (11-24-22 @ 03:10)  HbA1c:   Glucose:   BP: 111/73 (11-24-22 @ 01:34) (102/59 - 130/88)  Lipid Panel: 
BMI: BMI (kg/m2): 19.2 (11-24-22 @ 03:10)  HbA1c: A1C with Estimated Average Glucose Result: 4.8 % (11-28-22 @ 09:17)    Glucose:   BP: --  Lipid Panel: Date/Time: 11-28-22 @ 09:17  Cholesterol, Serum: 209  Direct LDL: --  HDL Cholesterol, Serum: 65  Total Cholesterol/HDL Ration Measurement: --  Triglycerides, Serum: 101  

## 2022-12-08 ENCOUNTER — OUTPATIENT (OUTPATIENT)
Dept: OUTPATIENT SERVICES | Facility: HOSPITAL | Age: 27
LOS: 1 days | Discharge: HOME | End: 2022-12-08

## 2023-02-22 ENCOUNTER — OUTPATIENT (OUTPATIENT)
Dept: OUTPATIENT SERVICES | Facility: HOSPITAL | Age: 28
LOS: 1 days | Discharge: HOME | End: 2023-02-22

## 2023-03-06 DIAGNOSIS — F11.20 OPIOID DEPENDENCE, UNCOMPLICATED: ICD-10-CM

## 2023-03-06 DIAGNOSIS — F43.12 POST-TRAUMATIC STRESS DISORDER, CHRONIC: ICD-10-CM

## 2023-03-06 DIAGNOSIS — F14.20 COCAINE DEPENDENCE, UNCOMPLICATED: ICD-10-CM

## 2023-03-31 NOTE — ED PROVIDER NOTE - PROGRESS NOTE
Patient having difficulties with preparing meals, doctor appointments and purchasing groceries  Homemaker services would be beneficial-orders placed   Stable.

## 2023-04-27 NOTE — ED PROVIDER NOTE - PROGRESS NOTE DETAILS
Patient is awake and alert x 3. Support/help offered including Rehab programs. Pt declined and requested discharge. Topical Sulfur Applications Pregnancy And Lactation Text: This medication is considered safe during pregnancy and breast feeding secondary to limited systemic absorption.

## 2023-04-28 ENCOUNTER — HOSPITAL ENCOUNTER (INPATIENT)
Dept: HOSPITAL 74 - YASAS | Age: 28
LOS: 4 days | Discharge: HOME | DRG: 773 | End: 2023-05-02
Attending: SURGERY | Admitting: ALLERGY & IMMUNOLOGY
Payer: COMMERCIAL

## 2023-04-28 VITALS — BODY MASS INDEX: 18.1 KG/M2

## 2023-04-28 DIAGNOSIS — F14.20: ICD-10-CM

## 2023-04-28 DIAGNOSIS — F32.A: ICD-10-CM

## 2023-04-28 DIAGNOSIS — F10.230: ICD-10-CM

## 2023-04-28 DIAGNOSIS — F41.9: ICD-10-CM

## 2023-04-28 DIAGNOSIS — F11.23: Primary | ICD-10-CM

## 2023-04-28 DIAGNOSIS — Z28.9: ICD-10-CM

## 2023-04-28 DIAGNOSIS — B18.2: ICD-10-CM

## 2023-04-28 DIAGNOSIS — Z28.310: ICD-10-CM

## 2023-04-28 DIAGNOSIS — Z86.69: ICD-10-CM

## 2023-04-28 DIAGNOSIS — F17.210: ICD-10-CM

## 2023-04-28 PROCEDURE — U0003 INFECTIOUS AGENT DETECTION BY NUCLEIC ACID (DNA OR RNA); SEVERE ACUTE RESPIRATORY SYNDROME CORONAVIRUS 2 (SARS-COV-2) (CORONAVIRUS DISEASE [COVID-19]), AMPLIFIED PROBE TECHNIQUE, MAKING USE OF HIGH THROUGHPUT TECHNOLOGIES AS DESCRIBED BY CMS-2020-01-R: HCPCS

## 2023-04-28 PROCEDURE — U0005 INFEC AGEN DETEC AMPLI PROBE: HCPCS

## 2023-04-29 PROCEDURE — HZ2ZZZZ DETOXIFICATION SERVICES FOR SUBSTANCE ABUSE TREATMENT: ICD-10-PCS | Performed by: SURGERY

## 2023-04-29 RX ADMIN — Medication SCH MG: at 22:52

## 2023-04-29 RX ADMIN — HYDROXYZINE PAMOATE PRN MG: 25 CAPSULE ORAL at 10:55

## 2023-04-29 RX ADMIN — METHOCARBAMOL PRN MG: 500 TABLET ORAL at 10:55

## 2023-04-29 RX ADMIN — Medication SCH: at 11:38

## 2023-04-30 RX ADMIN — Medication SCH MG: at 22:37

## 2023-04-30 RX ADMIN — HYDROXYZINE PAMOATE PRN MG: 25 CAPSULE ORAL at 19:05

## 2023-04-30 RX ADMIN — NICOTINE SCH MG: 14 PATCH, EXTENDED RELEASE TRANSDERMAL at 11:27

## 2023-04-30 RX ADMIN — Medication SCH TAB: at 10:11

## 2023-04-30 RX ADMIN — HYDROXYZINE PAMOATE PRN MG: 25 CAPSULE ORAL at 07:06

## 2023-05-01 RX ADMIN — Medication SCH MG: at 22:26

## 2023-05-01 RX ADMIN — Medication SCH TAB: at 10:18

## 2023-05-01 RX ADMIN — HYDROXYZINE PAMOATE PRN MG: 25 CAPSULE ORAL at 13:06

## 2023-05-01 RX ADMIN — NICOTINE SCH MG: 14 PATCH, EXTENDED RELEASE TRANSDERMAL at 10:19

## 2023-05-01 RX ADMIN — METHOCARBAMOL PRN MG: 500 TABLET ORAL at 22:28

## 2023-05-02 VITALS — HEART RATE: 83 BPM | TEMPERATURE: 98.4 F | SYSTOLIC BLOOD PRESSURE: 95 MMHG | DIASTOLIC BLOOD PRESSURE: 60 MMHG

## 2023-05-02 VITALS — RESPIRATION RATE: 16 BRPM

## 2023-05-02 RX ADMIN — NICOTINE SCH: 14 PATCH, EXTENDED RELEASE TRANSDERMAL at 10:05

## 2023-05-02 RX ADMIN — Medication SCH: at 10:05

## 2023-05-08 ENCOUNTER — APPOINTMENT (OUTPATIENT)
Dept: HEPATOLOGY | Facility: CLINIC | Age: 28
End: 2023-05-08

## 2023-05-12 NOTE — ED ADULT NURSE NOTE - NSFALLRSKHARMRISK_ED_ALL_ED
Call your physician immediately if you notice any of the following symptoms of a blood clot:   Sudden weakness in any limb  Numbness or tingling anywhere  Visual changes or loss of sight in either eye  Sudden onset of slurred speech or inability to speak  Dizziness or faintness  New pain, swelling, redness or heat in any extremity  New SOB or chest pain  Symptoms associated with blood clotting/low INR reviewed and patient verbalizes understanding: Yes:     Anticoagulation Summary  As of 2023    INR goal:  2.0-3.0   TTR:  71.4 % (11.1 y)   INR used for dosin.7 (2023)   Warfarin maintenance plan:  2.5 mg (5 mg x 0.5) every day   Weekly warfarin total:  17.5 mg   Plan last modified:  Jessica Castro RN (2023)   Next INR check:  2023   Priority:  Follow-Up - 4 Weeks   Target end date:   Indefinite    Indications    Other mechanical complication of other internal orthopedic device  implant  and graft (Resolved) [V10.368L]  Encounter for long-term (current) use of other medications [Z79.899]  Systemic lupus erythematosus (CMD) [M32.9]  Long term (current) use of anticoagulants [Z79.01]             Anticoagulation Episode Summary     INR check location:  Clinic Lab    Preferred lab:      Send INR reminders to:  ENRIKE BROWN NURSE MSG POOL    Comments:  Call Patient      Anticoagulation Care Providers     Provider Role Specialty Phone number    Phan DO Christopher Brambila [facility] [phone] no

## 2023-06-13 LAB
ALBUMIN SERPL ELPH-MCNC: 4.3 G/DL — SIGNIFICANT CHANGE UP (ref 3.3–5)
ALP SERPL-CCNC: 114 U/L — SIGNIFICANT CHANGE UP (ref 40–120)
ALT FLD-CCNC: 142 U/L — HIGH (ref 4–33)
ANION GAP SERPL CALC-SCNC: 11 MMOL/L — SIGNIFICANT CHANGE UP (ref 7–14)
AST SERPL-CCNC: 78 U/L — HIGH (ref 4–32)
BILIRUB SERPL-MCNC: <0.2 MG/DL — SIGNIFICANT CHANGE UP (ref 0.2–1.2)
BUN SERPL-MCNC: 17 MG/DL — SIGNIFICANT CHANGE UP (ref 7–23)
CALCIUM SERPL-MCNC: 9.4 MG/DL — SIGNIFICANT CHANGE UP (ref 8.4–10.5)
CHLORIDE SERPL-SCNC: 99 MMOL/L — SIGNIFICANT CHANGE UP (ref 98–107)
CHOLEST SERPL-MCNC: 172 MG/DL — SIGNIFICANT CHANGE UP
CO2 SERPL-SCNC: 25 MMOL/L — SIGNIFICANT CHANGE UP (ref 22–31)
CREAT SERPL-MCNC: 0.91 MG/DL — SIGNIFICANT CHANGE UP (ref 0.5–1.3)
EGFR: 88 ML/MIN/1.73M2 — SIGNIFICANT CHANGE UP
GLUCOSE SERPL-MCNC: 97 MG/DL — SIGNIFICANT CHANGE UP (ref 70–99)
HCT VFR BLD CALC: 44.2 % — SIGNIFICANT CHANGE UP (ref 34.5–45)
HDLC SERPL-MCNC: 62 MG/DL — SIGNIFICANT CHANGE UP
HGB BLD-MCNC: 14 G/DL — SIGNIFICANT CHANGE UP (ref 11.5–15.5)
LIPID PNL WITH DIRECT LDL SERPL: 91 MG/DL — SIGNIFICANT CHANGE UP
MCHC RBC-ENTMCNC: 30 PG — SIGNIFICANT CHANGE UP (ref 27–34)
MCHC RBC-ENTMCNC: 31.7 GM/DL — LOW (ref 32–36)
MCV RBC AUTO: 94.8 FL — SIGNIFICANT CHANGE UP (ref 80–100)
NON HDL CHOLESTEROL: 110 MG/DL — SIGNIFICANT CHANGE UP
NRBC # BLD: 0 /100 WBCS — SIGNIFICANT CHANGE UP (ref 0–0)
NRBC # FLD: 0 K/UL — SIGNIFICANT CHANGE UP (ref 0–0)
PLATELET # BLD AUTO: 222 K/UL — SIGNIFICANT CHANGE UP (ref 150–400)
POTASSIUM SERPL-MCNC: 4.4 MMOL/L — SIGNIFICANT CHANGE UP (ref 3.5–5.3)
POTASSIUM SERPL-SCNC: 4.4 MMOL/L — SIGNIFICANT CHANGE UP (ref 3.5–5.3)
PROT SERPL-MCNC: 7.7 G/DL — SIGNIFICANT CHANGE UP (ref 6–8.3)
RBC # BLD: 4.66 M/UL — SIGNIFICANT CHANGE UP (ref 3.8–5.2)
RBC # FLD: 12.1 % — SIGNIFICANT CHANGE UP (ref 10.3–14.5)
SODIUM SERPL-SCNC: 135 MMOL/L — SIGNIFICANT CHANGE UP (ref 135–145)
TRIGL SERPL-MCNC: 96 MG/DL — SIGNIFICANT CHANGE UP
WBC # BLD: 7.97 K/UL — SIGNIFICANT CHANGE UP (ref 3.8–10.5)
WBC # FLD AUTO: 7.97 K/UL — SIGNIFICANT CHANGE UP (ref 3.8–10.5)

## 2023-08-03 NOTE — ED PROVIDER NOTE - PHYSICAL EXAMINATION
Gen: Alert, NAD  Head: NC, AT, PERRL, 2mm reactive, EOMI, normal lids/conjunctiva  Neck: +supple, no tenderness/meningismus/JVD, +Trachea midline  Pulm: Bilateral BS, normal resp effort, no wheeze/stridor/retractions  CV: RRR, no M/R/G, +dist pulses  Abd: soft, NT/ND, +BS, no hepatosplenomegaly  Mskel: no edema/erythema/cyanosis  Skin: no rash  Neuro: AAOx3, no sensory/motor deficits yes

## 2023-11-23 NOTE — BH INPATIENT PSYCHIATRY PROGRESS NOTE - NSBHCONSDANGERSELF_PSY_A_CORE
Patient arrives with his wife with c/o severe headache and slow/difficult speech onset around 1600 today.  
suicidal behavior

## 2024-01-07 ENCOUNTER — EMERGENCY (EMERGENCY)
Facility: HOSPITAL | Age: 29
LOS: 1 days | Discharge: LEFT BEFORE DISCHARGE | End: 2024-01-07
Admitting: EMERGENCY MEDICINE
Payer: MEDICAID

## 2024-01-07 VITALS
TEMPERATURE: 99 F | SYSTOLIC BLOOD PRESSURE: 114 MMHG | DIASTOLIC BLOOD PRESSURE: 74 MMHG | OXYGEN SATURATION: 98 % | HEART RATE: 72 BPM | RESPIRATION RATE: 16 BRPM

## 2024-01-07 PROCEDURE — L9991: CPT

## 2024-01-07 NOTE — ED ADULT NURSE NOTE - ED_CALLED_NO_RESPONSE_NOTE 3
pt called several times- pt not in waiting room. pt alert,oriented x 3 at initial triage.  not seen leaving ed

## 2024-01-07 NOTE — ED ADULT TRIAGE NOTE - CHIEF COMPLAINT QUOTE
PT REQUESTING METHADONE. states last dose on thursday- reports  " doesn't go daily as transportation problems"  reports achiness to body ,headaches ,cold sweats since yesterday. last vomited  today x 1

## 2025-01-03 ENCOUNTER — APPOINTMENT (OUTPATIENT)
Dept: HEPATOLOGY | Facility: CLINIC | Age: 30
End: 2025-01-03
Payer: MEDICAID

## 2025-01-03 VITALS
SYSTOLIC BLOOD PRESSURE: 119 MMHG | HEIGHT: 64 IN | TEMPERATURE: 96.3 F | OXYGEN SATURATION: 96 % | WEIGHT: 150 LBS | BODY MASS INDEX: 25.61 KG/M2 | DIASTOLIC BLOOD PRESSURE: 78 MMHG | HEART RATE: 69 BPM | RESPIRATION RATE: 16 BRPM

## 2025-01-03 DIAGNOSIS — F10.90 ALCOHOL USE, UNSPECIFIED, UNCOMPLICATED: ICD-10-CM

## 2025-01-03 DIAGNOSIS — B18.2 CHRONIC VIRAL HEPATITIS C: ICD-10-CM

## 2025-01-03 DIAGNOSIS — F11.20 OPIOID DEPENDENCE, UNCOMPLICATED: ICD-10-CM

## 2025-01-03 DIAGNOSIS — K76.0 FATTY (CHANGE OF) LIVER, NOT ELSEWHERE CLASSIFIED: ICD-10-CM

## 2025-01-03 DIAGNOSIS — R74.8 ABNORMAL LEVELS OF OTHER SERUM ENZYMES: ICD-10-CM

## 2025-01-03 DIAGNOSIS — F10.90 FATTY (CHANGE OF) LIVER, NOT ELSEWHERE CLASSIFIED: ICD-10-CM

## 2025-01-03 DIAGNOSIS — E55.9 VITAMIN D DEFICIENCY, UNSPECIFIED: ICD-10-CM

## 2025-01-03 PROCEDURE — 99205 OFFICE O/P NEW HI 60 MIN: CPT

## 2025-01-03 RX ORDER — ACAMPROSATE CALCIUM 333 MG/1
333 TABLET, DELAYED RELEASE ORAL 3 TIMES DAILY
Qty: 180 | Refills: 0 | Status: ACTIVE | COMMUNITY
Start: 2025-01-03 | End: 1900-01-01

## 2025-01-06 PROBLEM — K76.0 METABOLIC DYSFUNCTION-ASSOCIATED STEATOTIC LIVER DISEASE AND INCREASED ALCOHOL INTAKE (METALD): Status: ACTIVE | Noted: 2025-01-06

## 2025-01-06 PROBLEM — E55.9 VITAMIN D INSUFFICIENCY: Status: ACTIVE | Noted: 2025-01-06

## 2025-01-06 PROBLEM — F10.90 ALCOHOL USE: Status: ACTIVE | Noted: 2025-01-06

## 2025-01-06 LAB
25(OH)D3 SERPL-MCNC: 22.1 NG/ML
AFP-TM SERPL-MCNC: 2.2 NG/ML
ALBUMIN SERPL ELPH-MCNC: 4 G/DL
ALP BLD-CCNC: 157 U/L
ALT SERPL-CCNC: 68 U/L
ANION GAP SERPL CALC-SCNC: 14 MMOL/L
AST SERPL-CCNC: 89 U/L
BILIRUB SERPL-MCNC: 0.4 MG/DL
BUN SERPL-MCNC: 10 MG/DL
CALCIUM SERPL-MCNC: 9.4 MG/DL
CHLORIDE SERPL-SCNC: 103 MMOL/L
CO2 SERPL-SCNC: 22 MMOL/L
CREAT SERPL-MCNC: 0.9 MG/DL
EGFR: 89 ML/MIN/1.73M2
FOLATE SERPL-MCNC: 11.4 NG/ML
GGT SERPL-CCNC: 139 U/L
HBV SURFACE AB SERPL IA-ACNC: 39.3 MIU/ML
HCT VFR BLD CALC: 44.6 %
HCV RNA SERPL NAA DL=5-ACNC: NORMAL IU/ML
HCV RNA SERPL NAA+PROBE-LOG IU: 5.34 LOGIU/ML
HEPATITIS A IGG ANTIBODY: REACTIVE
HEPC RNA INTERP: DETECTED
HGB BLD-MCNC: 14 G/DL
INR PPP: 0.97 RATIO
MCHC RBC-ENTMCNC: 28.4 PG
MCHC RBC-ENTMCNC: 31.4 G/DL
MCV RBC AUTO: 90.5 FL
PLATELET # BLD AUTO: 196 K/UL
POTASSIUM SERPL-SCNC: 4.7 MMOL/L
PROT SERPL-MCNC: 7.6 G/DL
PT BLD: 11.5 SEC
RBC # BLD: 4.93 M/UL
RBC # FLD: 13.7 %
SODIUM SERPL-SCNC: 138 MMOL/L
VIT B12 SERPL-MCNC: 749 PG/ML
WBC # FLD AUTO: 5.78 K/UL

## 2025-01-08 LAB
HCV GENTYP BLD NAA+PROBE: NORMAL
PETH 16:0/18:1: 218 NG/ML
PETH 16:0/18:2: 218 NG/ML
PETH COMMENTS: NORMAL

## 2025-01-09 LAB — HDV AB SER IA-ACNC: NEGATIVE

## 2025-04-16 NOTE — DIETITIAN INITIAL EVALUATION ADULT - ADD RECOMMEND
Judd was notified.   
Recommend using Tylenol Extra Strength, 1 to 2 tablets 3 times a day as needed or Aleve 1 tablet twice a day as needed  
Susie Rodriges, RN to [facility] Clinical Staff  TB      25  1:14 PM  Patient would like to know if ok to take OTC pain relievers and what is appropriate for general PRN use. Please follow up with patient and thank you, Susie Rodriges [phone]  Susie Rodriges RN  TB    25  1:13 PM  Note     Care Transitions Note     Final Call      Patient Current Location:  Home: [address] [place] [address]     Care Transition Nurse contacted the patient by telephone. Verified name and  as identifiers.     Patient graduated from the Care Transitions program on 25.  Patient/family verbalizes confidence in the ability to self-manage at this time. has the ability to self-manage at this time..       Advance Care Planning:   Does patient have an Advance Directive: reviewed during previous call, see note. .     Handoff:   Patient was not referred to the ACM team due to patient declined services.       Care Summary Note: Patient reached for final call. States doing well. RD and MSW outreach and next attempts reviewed. He has received ACP packet, not yet reviewed. He denies any neurological symptoms of concern, he is physically active within tolerance and taking breaks as needed. Reviewed neurology appt scheduled for . He will have questions prepared for this visit. Offered ACM for continued care coordination, denies need, encouraged to contact PCP office for future needs. Inquires on appropriate analgesic for PRN use, CNT will route to PCP request follow up with patient. No further questions or concerns verbalizes, agrees to graduation.      Assessments:  Care Transitions Subsequent and Final Call    Schedule Follow Up Appointment with PCP: Completed  Subsequent and Final Calls  Do you have any ongoing symptoms?: No  Have your medications changed?: No  Do you have any questions related to your medications?: No  Do you currently have any active services?: No  Do you have any needs or concerns 
Suggest add daily MVI for micronutrient coverage when medically appropriate. Monitor PO intake/tolerance, weights, labs, BM's, and skin integrity. Encourage po intake as tolerated and honor food preferences PRN.